# Patient Record
Sex: MALE | Race: WHITE | NOT HISPANIC OR LATINO | Employment: FULL TIME | URBAN - METROPOLITAN AREA
[De-identification: names, ages, dates, MRNs, and addresses within clinical notes are randomized per-mention and may not be internally consistent; named-entity substitution may affect disease eponyms.]

---

## 2019-10-11 ENCOUNTER — OFFICE VISIT (OUTPATIENT)
Dept: INTERNAL MEDICINE CLINIC | Facility: CLINIC | Age: 49
End: 2019-10-11
Payer: COMMERCIAL

## 2019-10-11 VITALS
DIASTOLIC BLOOD PRESSURE: 88 MMHG | BODY MASS INDEX: 28.47 KG/M2 | WEIGHT: 181.4 LBS | SYSTOLIC BLOOD PRESSURE: 150 MMHG | TEMPERATURE: 97.8 F | OXYGEN SATURATION: 98 % | HEIGHT: 67 IN | HEART RATE: 65 BPM

## 2019-10-11 DIAGNOSIS — Z23 NEED FOR INFLUENZA VACCINATION: Primary | ICD-10-CM

## 2019-10-11 DIAGNOSIS — Z00.00 ENCOUNTER FOR WELL ADULT EXAM WITHOUT ABNORMAL FINDINGS: ICD-10-CM

## 2019-10-11 PROBLEM — R03.0 ELEVATED BLOOD-PRESSURE READING WITHOUT DIAGNOSIS OF HYPERTENSION: Status: ACTIVE | Noted: 2019-10-11

## 2019-10-11 PROCEDURE — 99386 PREV VISIT NEW AGE 40-64: CPT | Performed by: INTERNAL MEDICINE
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
## 2019-10-11 NOTE — ASSESSMENT & PLAN NOTE
No acute or chronic problems identified  Blood pressure readings were in the high range of normal   Recommended low-salt foods and no added salt with diet  Continue his usual exercise regimen    We will continue to monitor this in follow-up

## 2019-10-13 LAB
ALBUMIN SERPL-MCNC: 4.5 G/DL (ref 3.6–5.1)
ALBUMIN/GLOB SERPL: 1.7 (CALC) (ref 1–2.5)
ALP SERPL-CCNC: 68 U/L (ref 40–115)
ALT SERPL-CCNC: 37 U/L (ref 9–46)
AST SERPL-CCNC: 27 U/L (ref 10–40)
BASOPHILS # BLD AUTO: 60 CELLS/UL (ref 0–200)
BASOPHILS NFR BLD AUTO: 1.4 %
BILIRUB SERPL-MCNC: 0.9 MG/DL (ref 0.2–1.2)
BUN SERPL-MCNC: 17 MG/DL (ref 7–25)
BUN/CREAT SERPL: NORMAL (CALC) (ref 6–22)
CALCIUM SERPL-MCNC: 9.7 MG/DL (ref 8.6–10.3)
CHLORIDE SERPL-SCNC: 105 MMOL/L (ref 98–110)
CHOLEST SERPL-MCNC: 175 MG/DL
CHOLEST/HDLC SERPL: 3.8 (CALC)
CO2 SERPL-SCNC: 31 MMOL/L (ref 20–32)
CREAT SERPL-MCNC: 1 MG/DL (ref 0.6–1.35)
EOSINOPHIL # BLD AUTO: 232 CELLS/UL (ref 15–500)
EOSINOPHIL NFR BLD AUTO: 5.4 %
ERYTHROCYTE [DISTWIDTH] IN BLOOD BY AUTOMATED COUNT: 12.3 % (ref 11–15)
GLOBULIN SER CALC-MCNC: 2.7 G/DL (CALC) (ref 1.9–3.7)
GLUCOSE SERPL-MCNC: 94 MG/DL (ref 65–99)
HCT VFR BLD AUTO: 47.9 % (ref 38.5–50)
HDLC SERPL-MCNC: 46 MG/DL
HGB BLD-MCNC: 16.3 G/DL (ref 13.2–17.1)
LDLC SERPL CALC-MCNC: 109 MG/DL (CALC)
LYMPHOCYTES # BLD AUTO: 1716 CELLS/UL (ref 850–3900)
LYMPHOCYTES NFR BLD AUTO: 39.9 %
MCH RBC QN AUTO: 32.1 PG (ref 27–33)
MCHC RBC AUTO-ENTMCNC: 34 G/DL (ref 32–36)
MCV RBC AUTO: 94.5 FL (ref 80–100)
MONOCYTES # BLD AUTO: 426 CELLS/UL (ref 200–950)
MONOCYTES NFR BLD AUTO: 9.9 %
NEUTROPHILS # BLD AUTO: 1866 CELLS/UL (ref 1500–7800)
NEUTROPHILS NFR BLD AUTO: 43.4 %
NONHDLC SERPL-MCNC: 129 MG/DL (CALC)
PLATELET # BLD AUTO: 202 THOUSAND/UL (ref 140–400)
PMV BLD REES-ECKER: 12.7 FL (ref 7.5–12.5)
POTASSIUM SERPL-SCNC: 4.3 MMOL/L (ref 3.5–5.3)
PROT SERPL-MCNC: 7.2 G/DL (ref 6.1–8.1)
RBC # BLD AUTO: 5.07 MILLION/UL (ref 4.2–5.8)
SL AMB EGFR AFRICAN AMERICAN: 102 ML/MIN/1.73M2
SL AMB EGFR NON AFRICAN AMERICAN: 88 ML/MIN/1.73M2
SODIUM SERPL-SCNC: 143 MMOL/L (ref 135–146)
TRIGL SERPL-MCNC: 107 MG/DL
WBC # BLD AUTO: 4.3 THOUSAND/UL (ref 3.8–10.8)

## 2019-12-04 ENCOUNTER — OFFICE VISIT (OUTPATIENT)
Dept: INTERNAL MEDICINE CLINIC | Facility: CLINIC | Age: 49
End: 2019-12-04
Payer: COMMERCIAL

## 2019-12-04 VITALS
OXYGEN SATURATION: 99 % | SYSTOLIC BLOOD PRESSURE: 152 MMHG | HEIGHT: 67 IN | BODY MASS INDEX: 27.31 KG/M2 | TEMPERATURE: 98.8 F | HEART RATE: 88 BPM | WEIGHT: 174 LBS | DIASTOLIC BLOOD PRESSURE: 90 MMHG

## 2019-12-04 DIAGNOSIS — M75.42 ROTATOR CUFF IMPINGEMENT SYNDROME OF LEFT SHOULDER: ICD-10-CM

## 2019-12-04 DIAGNOSIS — S43.432A LABRAL TEAR OF SHOULDER, LEFT, INITIAL ENCOUNTER: ICD-10-CM

## 2019-12-04 DIAGNOSIS — Z23 NEED FOR INFLUENZA VACCINATION: Primary | ICD-10-CM

## 2019-12-04 DIAGNOSIS — I10 HYPERTENSION, ESSENTIAL: ICD-10-CM

## 2019-12-04 PROCEDURE — 99213 OFFICE O/P EST LOW 20 MIN: CPT | Performed by: INTERNAL MEDICINE

## 2019-12-04 PROCEDURE — 3008F BODY MASS INDEX DOCD: CPT | Performed by: INTERNAL MEDICINE

## 2019-12-04 RX ORDER — AMLODIPINE BESYLATE 5 MG/1
5 TABLET ORAL DAILY
Qty: 90 TABLET | Refills: 3 | Status: SHIPPED | OUTPATIENT
Start: 2019-12-04 | End: 2021-04-02 | Stop reason: ALTCHOICE
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
## 2019-12-04 NOTE — ASSESSMENT & PLAN NOTE
Will schedule patient for MRI of shoulder and possibly orthopedic follow-up depending upon the results

## 2019-12-04 NOTE — ASSESSMENT & PLAN NOTE
Will initiate amlodipine 5 mg daily since his blood pressure has been sustained over his previous visits  Will re-evaluate once his shoulder problem is corrected    As his current pain symptomatology may be affecting his blood pressure

## 2019-12-04 NOTE — ASSESSMENT & PLAN NOTE
Will initiate amlodipine 5 mg daily for mildly elevated blood pressure which has been consistent since his last visit    Patient does admit to being noncompliant with low salt foods

## 2019-12-09 ENCOUNTER — TELEPHONE (OUTPATIENT)
Dept: INTERNAL MEDICINE CLINIC | Age: 49
End: 2019-12-09

## 2019-12-09 DIAGNOSIS — S43.432A LABRAL TEAR OF SHOULDER, LEFT, INITIAL ENCOUNTER: Primary | ICD-10-CM

## 2019-12-09 NOTE — TELEPHONE ENCOUNTER
Discussed with Dr Prashant Cannon  Can put in referral for PT    Left message on machine for patient to call me at [facility] office

## 2019-12-09 NOTE — TELEPHONE ENCOUNTER
Dr Sol Sandoval is denying the MRI of the Left Shoulder  They want him to have physical therapy, chiropractic treatments directed home exercise for four weeks in the last six months of his visit  IF you want to do a peer to peer please call them at [phone]    Tracking# [id number]    Patient has [employer]  Please due this today or tomorrow    Patient has an appt scheduled for the MRI this Friday 12/13/2019

## 2019-12-10 NOTE — TELEPHONE ENCOUNTER
Patient would like to cancel the MRI on Friday 12/13/19 and try the physical therapy for now    Please cancel appointment for patient

## 2019-12-11 ENCOUNTER — EVALUATION (OUTPATIENT)
Dept: PHYSICAL THERAPY | Age: 49
End: 2019-12-11
Payer: COMMERCIAL

## 2019-12-11 DIAGNOSIS — S43.432A LABRAL TEAR OF SHOULDER, LEFT, INITIAL ENCOUNTER: Primary | ICD-10-CM

## 2019-12-11 PROCEDURE — 97161 PT EVAL LOW COMPLEX 20 MIN: CPT | Performed by: PHYSICAL THERAPIST

## 2019-12-11 PROCEDURE — 97110 THERAPEUTIC EXERCISES: CPT | Performed by: PHYSICAL THERAPIST

## 2019-12-11 NOTE — PROGRESS NOTES
PT Evaluation     Today's date: 2019  Patient name: Rachel Suarez  : 1970  MRN: [id number]  Referring provider: Lionel Brownlee MD  Dx:   Encounter Diagnosis     ICD-10-CM    1  Labral tear of shoulder, left, initial encounter J39 431G Ambulatory referral to Physical Therapy                  Assessment  Assessment details: Pt presents to PT with cc of L shoulder, neck, UT and paraspinal pain  Pt has radiculopathy in L UE  That originates in cervical spine as seen with spurlings test and movement testing  Pt has been guarding shoulder due to pain which is likely contributing to decreased ROM at this time  Pt will benefit from skilled PT in order to improve LUE strength and ROM and decrease radicular symptoms     Impairments: abnormal coordination, abnormal muscle firing, abnormal muscle tone, abnormal or restricted ROM, abnormal movement, impaired balance, impaired physical strength, lacks appropriate home exercise program, pain with function, scapular dyskinesis, poor posture  and poor body mechanics    Goals  In 4 weeks pt will:  -Be independent with phase I of HEP  -report pain 5/10 at worst    By discharge pt will:  -Be independent with Phase II of HEP  -demonstrate full L shoulder ROM  -perform full cervical ROM w/o radiculopathy     Plan  Patient would benefit from: skilled physical therapy  Planned modality interventions: TENS, cryotherapy and thermotherapy: hydrocollator packs  Planned therapy interventions: joint mobilization, activity modification, neuromuscular re-education, patient education, postural training, functional ROM exercises, home exercise program, therapeutic exercise, therapeutic activities, manual therapy and coordination  Frequency: 2x week  Duration in weeks: 6  Plan of Care beginning date: 2019  Plan of Care expiration date: 2020  Treatment plan discussed with: patient        Subjective Evaluation    History of Present Illness  Date of onset: 9/15/2019  Mechanism of injury: Pt reports to PT with pain in L shoulder, UT and neck  Pt guards L shoulder and avoids elevation of extremity  Pt has N/T into LUE with elevation and cervical extension  Pain  Current pain ratin  At best pain ratin  At worst pain rating: 10    Patient Goals  Patient goals for therapy: decreased pain, increased motion, increased strength, independence with ADLs/IADLs and return to sport/leisure activities          Objective     Neurological Testing     Sensation   Cervical/Thoracic   Left   Diminished: light touch    Reflexes   Left   Biceps (C5/C6): normal (2+)  Brachioradialis (C6): normal (2+)    Active Range of Motion   Cervical/Thoracic Spine       Cervical    Flexion:  WFL  Extension:  WFL  Left lateral flexion:  WFL  Right lateral flexion:  WFL  Left rotation:  [facility]  Right rotation:  [facility]    Strength/Myotome Testing     Left Shoulder     Planes of Motion   External rotation at 0°: 3+   Internal rotation at 0°: 3+     Left Elbow   Flexion: 3+  Extension: 3+    Left Wrist/Hand   Wrist flexion: 3+    Tests   Cervical     Left   Positive Spurling's Test A  Left Shoulder   Positive ULTT1, ULTT3 and ULTT4  Precautions: N/A      Manual                                                                                   Exercise Diary              Repeated extension- C spine comp  Median nerve glides Comp                                                                                                                                                                                                                                                            Modalities

## 2019-12-13 ENCOUNTER — OFFICE VISIT (OUTPATIENT)
Dept: PHYSICAL THERAPY | Age: 49
End: 2019-12-13
Payer: COMMERCIAL

## 2019-12-13 DIAGNOSIS — S43.432A LABRAL TEAR OF SHOULDER, LEFT, INITIAL ENCOUNTER: Primary | ICD-10-CM

## 2019-12-13 PROCEDURE — 97110 THERAPEUTIC EXERCISES: CPT | Performed by: PHYSICAL THERAPIST

## 2019-12-13 PROCEDURE — 97140 MANUAL THERAPY 1/> REGIONS: CPT | Performed by: PHYSICAL THERAPIST

## 2019-12-13 NOTE — PROGRESS NOTES
Daily Note     Today's date: 2019  Patient name: Rajani Germain  : 1970  MRN: [id number]  Referring provider: Rosario Torrez MD  Dx:   Encounter Diagnosis     ICD-10-CM    1  Labral tear of shoulder, left, initial encounter H64 963X                   Subjective: Pt reports less symptoms after repeated cervical extension      Objective: See treatment diary below      Assessment: Tolerated treatment well  Patient demonstrated fatigue post treatment, would benefit from continued PT and pt has increased paraspinal/periscapular tone  Pt has increased L shoulder motion since eval and reports less N/T overall, but continues to have intermittent N/T into L hand  Plan: Continue per plan of care  Progress treatment as tolerated  Precautions: N/A      Manual              STM/IASTM L scap  JEONG           CTJ grade IV  JEONG           T spine mobs  II/III                                         Exercise Diary             Repeated extension- C spine comp   x20           Median nerve glides Comp  np           Prolonged C-spine ext  2'           Pec minor stretch  5x30s                                                                                                                                                                                                                               Modalities

## 2019-12-17 ENCOUNTER — OFFICE VISIT (OUTPATIENT)
Dept: PHYSICAL THERAPY | Age: 49
End: 2019-12-17
Payer: COMMERCIAL

## 2019-12-17 DIAGNOSIS — S43.432A LABRAL TEAR OF SHOULDER, LEFT, INITIAL ENCOUNTER: Primary | ICD-10-CM

## 2019-12-17 PROCEDURE — 97140 MANUAL THERAPY 1/> REGIONS: CPT | Performed by: PHYSICAL THERAPIST

## 2019-12-17 PROCEDURE — 97110 THERAPEUTIC EXERCISES: CPT | Performed by: PHYSICAL THERAPIST

## 2019-12-17 NOTE — PROGRESS NOTES
Daily Note     Today's date: 2019  Patient name: Evaristo Haque  : 1970  MRN: [id number]  Referring provider: Fer Chavez MD  Dx:   Encounter Diagnosis     ICD-10-CM    1  Labral tear of shoulder, left, initial encounter P44 366D                   Subjective: Pt reports less symptoms overall, but continues to have N/T into LUE with shoulder elevation      Objective: See treatment diary below      Assessment: Tolerated treatment well  Patient demonstrated fatigue post treatment, would benefit from continued PT and continues to have N/T into LUE with stretching of pec minor and UE elevation  Plan: Continue per plan of care  Progress treatment as tolerated  Precautions: N/A      Manual             STM/IASTM L scap  JEONG JEONG          CTJ grade V  JEONG np          T spine mobs  II/III III/IV          GH mobs   JEONG          ER/IR   JEONG          Rhy stabilization    JEONG              Exercise Diary            Repeated extension- C spine comp   x20 np          Median nerve glides Comp  np np          Prolonged C-spine ext  2' np          Pec minor stretch  5x30s 5x30s          LS/UT stretch   5x30s                                                                                                                                                                                                                 Modalities

## 2019-12-20 ENCOUNTER — OFFICE VISIT (OUTPATIENT)
Dept: PHYSICAL THERAPY | Age: 49
End: 2019-12-20
Payer: COMMERCIAL

## 2019-12-20 DIAGNOSIS — S43.432A LABRAL TEAR OF SHOULDER, LEFT, INITIAL ENCOUNTER: Primary | ICD-10-CM

## 2019-12-20 PROCEDURE — 97140 MANUAL THERAPY 1/> REGIONS: CPT | Performed by: PHYSICAL THERAPIST

## 2019-12-20 PROCEDURE — 97110 THERAPEUTIC EXERCISES: CPT | Performed by: PHYSICAL THERAPIST

## 2019-12-20 PROCEDURE — 97112 NEUROMUSCULAR REEDUCATION: CPT | Performed by: PHYSICAL THERAPIST

## 2019-12-20 NOTE — PROGRESS NOTES
Daily Note     Today's date: 2019  Patient name: Christina Jj  : 1970  MRN: [id number]  Referring provider: Diesy Clemente MD  Dx:   Encounter Diagnosis     ICD-10-CM    1  Labral tear of shoulder, left, initial encounter Z90 714R                   Subjective: Pt reports having less pain/difficulty with shoulder movement  States forearm numbness has decreased, 1st finger remains numb      Objective: See treatment diary below      Assessment: Tolerated treatment well  Patient demonstrated fatigue post treatment, would benefit from continued PT and has less radicular symptoms but continues to have pain in periscapular region  Plan: Continue per plan of care  Progress treatment as tolerated  Precautions: N/A      Manual            STM/IASTM L scap  JEONG JEONG JEONG         CTJ grade V  JEONG np np         T spine mobs  II/III III/IV JEONG         GH mobs   JEONG np         ER/IR   JEONG np         Rhy stabilization    JEONG np         Scap PNF    JEONG         All 3 nerve glides    JEONG             Exercise Diary           Repeated extension- C spine comp   x20 np x10         Median nerve glides Comp  np np np         Prolonged C-spine ext  2' np np         Pec minor stretch  5x30s 5x30s          LS/UT stretch   5x30s          Lat stretch    5x30s                                                                                                                                                                                                   Modalities

## 2019-12-23 ENCOUNTER — OFFICE VISIT (OUTPATIENT)
Dept: PHYSICAL THERAPY | Age: 49
End: 2019-12-23
Payer: COMMERCIAL

## 2019-12-23 DIAGNOSIS — S43.432A LABRAL TEAR OF SHOULDER, LEFT, INITIAL ENCOUNTER: Primary | ICD-10-CM

## 2019-12-23 PROCEDURE — 97140 MANUAL THERAPY 1/> REGIONS: CPT | Performed by: PHYSICAL THERAPIST

## 2019-12-23 PROCEDURE — 97112 NEUROMUSCULAR REEDUCATION: CPT | Performed by: PHYSICAL THERAPIST

## 2019-12-23 PROCEDURE — 97110 THERAPEUTIC EXERCISES: CPT | Performed by: PHYSICAL THERAPIST

## 2019-12-23 NOTE — PROGRESS NOTES
Daily Note     Today's date: 2019  Patient name: Annalee Matos  : 1970  MRN: [id number]  Referring provider: Mainor Ham MD  Dx:   Encounter Diagnosis     ICD-10-CM    1  Labral tear of shoulder, left, initial encounter U35 572R                   Subjective: pt reports less N/T into LUE, decreased pain in L UT      Objective: See treatment diary below      Assessment: Tolerated treatment well  Patient demonstrated fatigue post treatment, would benefit from continued PT and has decreased N/T into LUE, but weakness remains as in tricep and bracioradialis  Plan: Continue per plan of care  Progress treatment as tolerated  Precautions: N/A      Manual           STM/IASTM L scap  JEONG JEONG JEONG JEONG        CTJ grade V  JEONG np np np        T spine mobs  II/III III/IV JEONG np        GH mobs   JEONG np np        ER/IR   JEONG np np        Rhy stabilization    JEONG np np        Scap PNF    JEONG np        All 3 nerve glides    JEONG JEONG            Exercise Diary          Repeated extension- C spine comp   x20 np x10 np        Median nerve glides Comp  np np np np        Prolonged C-spine ext  2' np np np        Pec minor stretch  5x30s 5x30s 5x30s 5x30s        LS/UT stretch   5x30s np np        Lat stretch    5x30s 5x30s        Arm Bike     5'        tricep ext     2x x10, x5, xtf        Brachioradialis curl     2x10 3rd TF                                                                                                                                                           Modalities

## 2019-12-24 ENCOUNTER — OFFICE VISIT (OUTPATIENT)
Dept: PHYSICAL THERAPY | Age: 49
End: 2019-12-24
Payer: COMMERCIAL

## 2019-12-24 DIAGNOSIS — S43.432A LABRAL TEAR OF SHOULDER, LEFT, INITIAL ENCOUNTER: Primary | ICD-10-CM

## 2019-12-24 PROCEDURE — 97112 NEUROMUSCULAR REEDUCATION: CPT | Performed by: PHYSICAL THERAPIST

## 2019-12-24 PROCEDURE — 97140 MANUAL THERAPY 1/> REGIONS: CPT | Performed by: PHYSICAL THERAPIST

## 2019-12-24 PROCEDURE — 97110 THERAPEUTIC EXERCISES: CPT | Performed by: PHYSICAL THERAPIST

## 2019-12-24 NOTE — PROGRESS NOTES
Daily Note     Today's date: 2019  Patient name: Erin Maldonado  : 1970  MRN: [id number]  Referring provider: Case Stovall MD  Dx:   Encounter Diagnosis     ICD-10-CM    1  Labral tear of shoulder, left, initial encounter V35 705L                   Subjective: Pt reports minimal soreness/pain       Objective: See treatment diary below      Assessment: Tolerated treatment well  Patient demonstrated fatigue post treatment, would benefit from continued PT and continues to have C6-C7 myotomal weakness but has shown an increase in L shoulder ROM      Plan: Continue per plan of care  Progress treatment as tolerated  Precautions: N/A      Manual          STM/IASTM L scap  JEONG JEONG JEONG JEONG np       CTJ grade V  JEONG np np np np       T spine mobs  II/III III/IV JEONG np np       GH mobs   JEONG np np JEONG       ER/IR   JEONG np np np       Rhy stabilization    JEONG np np np       Scap PNF    JEONG np np       All 3 nerve glides    JEONG JEONG np           Exercise Diary         Repeated extension- C spine comp   x20 np x10 np np       Median nerve glides Comp  np np np np np       Prolonged C-spine ext  2' np np np np       Pec minor stretch  5x30s 5x30s 5x30s 5x30s 5x30s       LS/UT stretch   5x30s np np np       Lat stretch    5x30s 5x30s np       Arm Bike     5' 10'       tricep ext     2x x10, x5, xtf 2x x10, 3rd TF 20#       Brachioradialis curl     2x10 3rd TF  8# 2x10 3rd TF  8#       therabar wrist flex/ext      2'                                                                                                                                             Modalities

## 2019-12-30 ENCOUNTER — OFFICE VISIT (OUTPATIENT)
Dept: PHYSICAL THERAPY | Age: 49
End: 2019-12-30
Payer: COMMERCIAL

## 2019-12-30 DIAGNOSIS — S43.432A LABRAL TEAR OF SHOULDER, LEFT, INITIAL ENCOUNTER: Primary | ICD-10-CM

## 2019-12-30 PROCEDURE — 97140 MANUAL THERAPY 1/> REGIONS: CPT | Performed by: PHYSICAL THERAPIST

## 2019-12-30 PROCEDURE — 97112 NEUROMUSCULAR REEDUCATION: CPT | Performed by: PHYSICAL THERAPIST

## 2019-12-30 NOTE — PROGRESS NOTES
Daily Note     Today's date: 2019  Patient name: Evaristo Haque  : 1970  MRN: [id number]  Referring provider: Fer Chavez MD  Dx:   Encounter Diagnosis     ICD-10-CM    1  Labral tear of shoulder, left, initial encounter M19 039R                   Subjective: Pt reports no symptoms upon waking up, but has pain, N/T in LUE once he sits up and move LUE      Objective: See treatment diary below      Assessment: Tolerated treatment well  Patient demonstrated fatigue post treatment, would benefit from continued PT and has increased strength in C6-C7 myotome as compared to start of PT  Pt has less N/T into LUE but it is brought on by L shoulder elevation  Plan: Continue per plan of care  Progress treatment as tolerated  Precautions: N/A      Manual         STM/IASTM L scap  JEONG JEOGN JEONG JEONG np np      CTJ grade V  JEONG np np np np np      T spine mobs  II/III III/IV JEONG np np np      GH mobs   JEONG np np JEONG JEONG      ER/IR   JEONG np np np np      Rhy stabilization    JEONG np np np np      Scap PNF    JEONG np np np      All 3 nerve glides    JEONG JEONG np np          Exercise Diary        Repeated extension- C spine comp   x20 np x10 np np np      Median nerve glides Comp  np np np np np np      Prolonged C-spine ext  2' np np np np np      Pec minor stretch  5x30s 5x30s 5x30s 5x30s 5x30s np      LS/UT stretch   5x30s np np np np      Lat stretch    5x30s 5x30s np np      Arm Bike     5' 10' 10'      tricep ext     2x x10, x5, xtf 2x x10, 3rd TF 20# 2x x10, 3rd TF 20#      Brachioradialis curl     2x10 3rd TF  8# 2x10 3rd TF  8# 2x10 3rd TF  8#      therabar wrist flex/ext      2' np      Rows       25# 3x15      Bicep eccentrics-CC       10# 3x15                                                                                                                  Modalities

## 2020-01-02 ENCOUNTER — OFFICE VISIT (OUTPATIENT)
Dept: PHYSICAL THERAPY | Age: 50
End: 2020-01-02
Payer: COMMERCIAL

## 2020-01-02 DIAGNOSIS — S43.432A LABRAL TEAR OF SHOULDER, LEFT, INITIAL ENCOUNTER: Primary | ICD-10-CM

## 2020-01-02 PROCEDURE — 97112 NEUROMUSCULAR REEDUCATION: CPT | Performed by: PHYSICAL THERAPIST

## 2020-01-02 PROCEDURE — 97140 MANUAL THERAPY 1/> REGIONS: CPT | Performed by: PHYSICAL THERAPIST

## 2020-01-02 PROCEDURE — 97110 THERAPEUTIC EXERCISES: CPT | Performed by: PHYSICAL THERAPIST

## 2020-01-02 NOTE — PROGRESS NOTES
Daily Note     Today's date: 2020  Patient name: Deb Enriquez  : 1970  MRN: [id number]  Referring provider: Joy Escobar MD  Dx:   Encounter Diagnosis     ICD-10-CM    1  Labral tear of shoulder, left, initial encounter U49 735L                   Subjective: Pt reports minimal changes      Objective: See treatment diary below      Assessment: Tolerated treatment well  Patient demonstrated fatigue post treatment, would benefit from continued PT and continues to have weakness in elbow flexors, wrist extenders      Plan: Continue per plan of care  Progress treatment as tolerated  Precautions: N/A      Manual        STM/IASTM L scap  JEONG JEONG JEONG JEONG np np np     CTJ grade V  JEONG np np np np np np     T spine mobs  II/III III/IV JEONG np np np np     GH mobs   JEONG np np JEONG JEONG JEONG     ER/IR   JEONG np np np np np     Rhy stabilization    JEONG np np np np np     Scap PNF    JEONG np np np np     All 3 nerve glides    JEONG JEONG np np JEONG         Exercise Diary       Repeated extension- C spine comp   x20 np x10 np np np np     Median nerve glides Comp  np np np np np np np     Prolonged C-spine ext  2' np np np np np np     Pec minor stretch  5x30s 5x30s 5x30s 5x30s 5x30s np np     LS/UT stretch   5x30s np np np np np     Lat stretch    5x30s 5x30s np np np     Arm Bike     5' 10' 10' 10'     tricep ext     2x x10, x5, xtf 2x x10, 3rd TF 20# 2x x10, 3rd TF 20# 2x x10, 3rd TF 30#     Brachioradialis curl     2x10 3rd TF  8# 2x10 3rd TF  8# 2x10 3rd TF  8# 2x10 3rd TF  15#     therabar wrist flex/ext      2' np 2'     Rows       25# 3x15 np     Bicep eccentrics-CC       10# 3x15 10# 3x15                                                                                                                 Modalities

## 2020-01-07 ENCOUNTER — OFFICE VISIT (OUTPATIENT)
Dept: PHYSICAL THERAPY | Age: 50
End: 2020-01-07
Payer: COMMERCIAL

## 2020-01-07 DIAGNOSIS — S43.432A LABRAL TEAR OF SHOULDER, LEFT, INITIAL ENCOUNTER: Primary | ICD-10-CM

## 2020-01-07 PROCEDURE — 97112 NEUROMUSCULAR REEDUCATION: CPT | Performed by: PHYSICAL THERAPIST

## 2020-01-07 PROCEDURE — 97110 THERAPEUTIC EXERCISES: CPT | Performed by: PHYSICAL THERAPIST

## 2020-01-07 PROCEDURE — 97140 MANUAL THERAPY 1/> REGIONS: CPT | Performed by: PHYSICAL THERAPIST

## 2020-01-07 NOTE — PROGRESS NOTES
Daily Note     Today's date: 2020  Patient name: Carlos Carr  : 1970  MRN: [id number]  Referring provider: Beata Kothari MD  Dx:   Encounter Diagnosis     ICD-10-CM    1  Labral tear of shoulder, left, initial encounter C40 552O                    Subjective: Pt reports feeling stronger overall, but states he is still unable to do a push up due to tricep weakness      Objective: See treatment diary below      Assessment: Tolerated treatment well  Patient demonstrated fatigue post treatment, would benefit from continued PT and continues to have pain in bicep tendon with activity  Pain in anterior shoulder is likely capsular tightness      Plan: Continue per plan of care  Progress treatment as tolerated  Precautions: N/A      Manual       STM/IASTM L scap  JEONG JEONG JEONG JEONG np np np np    CTJ grade V  JEONG np np np np np np np    T spine mobs  II/III III/IV JEONG np np np np np    GH mobs   JEONG np np JEONG JEONG JEONG JEONG    ER/IR   JEONG np np np np np np    Rhy stabilization    JEONG np np np np np JEONG    Scap PNF    JEONG np np np np np    All 3 nerve glides    JEONG JEONG np np JEONG np    C-ext off plinth         JEONG        Exercise Diary      Repeated extension- C spine comp   x20 np x10 np np np np np    Median nerve glides Comp  np np np np np np np np    Prolonged C-spine ext  2' np np np np np np np    Pec minor stretch  5x30s 5x30s 5x30s 5x30s 5x30s np np np    LS/UT stretch   5x30s np np np np np np    Lat stretch    5x30s 5x30s np np np np    Arm Bike     5' 10' 10' 10' 10'    tricep ext     2x x10, x5, xtf 2x x10, 3rd TF 20# 2x x10, 3rd TF 20# 2x x10, 3rd TF 30# 2x x10, 3rd TF 30#    Brachioradialis curl     2x10 3rd TF  8# 2x10 3rd TF  8# 2x10 3rd TF  8# 2x10 3rd TF  15# np    therabar wrist flex/ext      2' np 2' 5'    Rows       25# 3x15 np np    Bicep eccentrics-CC       10# 3x15 10# 3x15 15# 3x15    Finger webbing 5'                                                                                                   Modalities

## 2020-01-09 ENCOUNTER — OFFICE VISIT (OUTPATIENT)
Dept: PHYSICAL THERAPY | Age: 50
End: 2020-01-09
Payer: COMMERCIAL

## 2020-01-09 DIAGNOSIS — S43.432A LABRAL TEAR OF SHOULDER, LEFT, INITIAL ENCOUNTER: Primary | ICD-10-CM

## 2020-01-09 PROCEDURE — 97140 MANUAL THERAPY 1/> REGIONS: CPT | Performed by: PHYSICAL THERAPIST

## 2020-01-09 PROCEDURE — 97112 NEUROMUSCULAR REEDUCATION: CPT | Performed by: PHYSICAL THERAPIST

## 2020-01-09 PROCEDURE — 97110 THERAPEUTIC EXERCISES: CPT | Performed by: PHYSICAL THERAPIST

## 2020-01-09 NOTE — PROGRESS NOTES
Daily Note     Today's date: 2020  Patient name: Brett Collins  : 1970  MRN: [id number]  Referring provider: Daniel Villanueva MD  Dx:   Encounter Diagnosis     ICD-10-CM    1  Labral tear of shoulder, left, initial encounter S43 432A            Pt started treatment with PTA       Subjective: Pt reports minimal N/T into LUE       Objective: See treatment diary below      Assessment: Tolerated treatment well  Patient demonstrated fatigue post treatment, would benefit from continued PT and has anterior shoulder pain with ER/ cross body abduction  Pt has relief with anterior shoulder mobilizations indicating decreased capsular movement  Plan: Continue per plan of care  Progress treatment as tolerated  Precautions: N/A      Manual      STM/IASTM L scap  JEONG JEONG JEONG JEONG np np np np np   CTJ grade V  JEONG np np np np np np np np   T spine mobs  II/III III/IV JEONG np np np np np np   GH mobs   JEONG np np JEONG JEONG JEONG JEONG Inf/ant JEONG   ER/IR   JEONG np np np np np np np   Rhy stabilization    JEONG np np np np np JEONG np   Scap PNF    JEONG np np np np np np   All 3 nerve glides    JEONG JEONG np np JEONG np np   C-ext off plinth         JEONG np       Exercise Diary     Repeated extension- C spine comp   x20 np x10 np np np np np np   Median nerve glides Comp  np np np np np np np np np   Prolonged C-spine ext  2' np np np np np np np np   Pec minor stretch  5x30s 5x30s 5x30s 5x30s 5x30s np np np 5x30s minor/ajor   LS/UT stretch   5x30s np np np np np np np   Lat stretch    5x30s 5x30s np np np np np   Arm Bike     5' 10' 10' 10' 10' 10'   tricep ext     2x x10, x5, xtf 2x x10, 3rd TF 20# 2x x10, 3rd TF 20# 2x x10, 3rd TF 30# 2x x10, 3rd TF 30# 2x x10, 3rd TF 30#   Brachioradialis curl     2x10 3rd TF  8# 2x10 3rd TF  8# 2x10 3rd TF  8# 2x10 3rd TF  15# np 2x10 3rd TF 10#   therabar wrist flex/ext      2' np 2' 5' np   Rows 25# 3x15 np np np   Bicep eccentrics-CC       10# 3x15 10# 3x15 15# 3x15 15# 3x15   Finger webbing         5' np                                                                                                  Modalities

## 2020-01-14 ENCOUNTER — OFFICE VISIT (OUTPATIENT)
Dept: PHYSICAL THERAPY | Age: 50
End: 2020-01-14
Payer: COMMERCIAL

## 2020-01-14 DIAGNOSIS — S43.432A LABRAL TEAR OF SHOULDER, LEFT, INITIAL ENCOUNTER: Primary | ICD-10-CM

## 2020-01-14 PROCEDURE — 97112 NEUROMUSCULAR REEDUCATION: CPT | Performed by: PHYSICAL THERAPIST

## 2020-01-14 PROCEDURE — 97140 MANUAL THERAPY 1/> REGIONS: CPT | Performed by: PHYSICAL THERAPIST

## 2020-01-14 PROCEDURE — 97110 THERAPEUTIC EXERCISES: CPT | Performed by: PHYSICAL THERAPIST

## 2020-01-14 NOTE — PROGRESS NOTES
Daily Note     Today's date: 2020  Patient name: Kae Alexander  : 1970  MRN: [id number]  Referring provider: Harvey Martínez MD  Dx:   Encounter Diagnosis     ICD-10-CM    1  Labral tear of shoulder, left, initial encounter X23 811M                   Subjective: Pt reports ability to do push up for first time since pain began       Objective: See treatment diary below      Assessment: Tolerated treatment well  Patient demonstrated fatigue post treatment, would benefit from continued PT and demonstrates increased strength in triceps and pec major/minor  Plan: Continue per plan of care  Progress treatment as tolerated         Precautions: N/A      Manual     STM/IASTM L scap np         np   CTJ grade V np         np   T spine mobs np         np   GH mobs Ant-JEONG         Inf/ant JEONG   ER/IR np         np   Rhy stabilization  np         np   Scap PNF np         np   All 3 nerve glides np         np   Manual resistance triceps JEONG            C-ext off plinth np         np       Exercise Diary     Repeated extension- C spine np         np   Median nerve glides np         np   Prolonged C-spine ext np         np   Pec minor stretch np         5x30s minor/ajor   LS/UT stretch np         np   Lat stretch np         np   Arm Bike 10'         10'   tricep ext 40# 3x10         2x x10, 3rd TF 30#   Brachioradialis curl np         2x10 3rd TF 10#   therabar wrist flex/ext GTB 5'         np   Rows np         np   Bicep eccentrics-CC np         15# 3x15   Finger webbing 2'         np   Lat pd Cc 40# 3x15            theracane 5'                                                                                 Modalities

## 2020-01-16 ENCOUNTER — EVALUATION (OUTPATIENT)
Dept: PHYSICAL THERAPY | Age: 50
End: 2020-01-16
Payer: COMMERCIAL

## 2020-01-16 DIAGNOSIS — S43.432A LABRAL TEAR OF SHOULDER, LEFT, INITIAL ENCOUNTER: Primary | ICD-10-CM

## 2020-01-16 PROCEDURE — 97750 PHYSICAL PERFORMANCE TEST: CPT | Performed by: PHYSICAL THERAPIST

## 2020-01-16 PROCEDURE — 97112 NEUROMUSCULAR REEDUCATION: CPT | Performed by: PHYSICAL THERAPIST

## 2020-01-16 PROCEDURE — 97110 THERAPEUTIC EXERCISES: CPT | Performed by: PHYSICAL THERAPIST

## 2020-01-16 PROCEDURE — 97140 MANUAL THERAPY 1/> REGIONS: CPT | Performed by: PHYSICAL THERAPIST

## 2020-01-16 NOTE — PROGRESS NOTES
PT Evaluation     Today's date: 2020  Patient name: Delroy Sewell  : 1970  MRN: [id number]  Referring provider: Phillip Fonseca MD  Dx:   Encounter Diagnosis     ICD-10-CM    1  Labral tear of shoulder, left, initial encounter T71 727W                   Assessment  Assessment details: Since presenting to PT, pt has made improvements in L UE strength and pain-free cervical ROM  Pt has decreased N/T into LUE but continues to have weakness, particularly in L triceps and has intermittent L shoulder pain with ADLs  Pt will benefit from skilled PT in order to improve UE strength and decrease pain/difficulty with ADLs     Impairments: abnormal coordination, abnormal muscle firing, abnormal muscle tone, abnormal or restricted ROM, abnormal movement, impaired balance, impaired physical strength, pain with function, scapular dyskinesis, poor posture  and poor body mechanics    Goals  In 4 weeks pt will:  -Be independent with phase I of HEP-met  -report pain 5/10 at worst-met    By discharge pt will:  -Be independent with Phase II of HEP-progressing, continue  -demonstrate full L shoulder ROM-met  -perform full cervical ROM w/o radiculopathy -progressing, continue    Plan  Patient would benefit from: skilled physical therapy  Planned modality interventions: TENS, cryotherapy and thermotherapy: hydrocollator packs  Planned therapy interventions: joint mobilization, activity modification, neuromuscular re-education, patient education, postural training, functional ROM exercises, home exercise program, therapeutic exercise, therapeutic activities, manual therapy and coordination  Frequency: 2x week  Duration in weeks: 6  Plan of Care beginning date: 2020  Plan of Care expiration date: 2020  Treatment plan discussed with: patient        Subjective Evaluation    History of Present Illness  Date of onset: 9/15/2019  Mechanism of injury: Pt reports less pain overall and has improved strength but continues to have weakness in L triceps  Pain  Current pain ratin  At best pain ratin  At worst pain rating: 3    Patient Goals  Patient goals for therapy: decreased pain, increased motion, increased strength, independence with ADLs/IADLs and return to sport/leisure activities          Objective     Active Range of Motion   Cervical/Thoracic Spine       Cervical    Flexion:  WFL  Extension:  WFL  Left lateral flexion:  WFL  Right lateral flexion:  WFL  Left rotation:  WFL  Right rotation:  [facility]    Strength/Myotome Testing     Left Shoulder     Planes of Motion   External rotation at 0°: 4   Internal rotation at 0°: 4     Left Elbow   Flexion: 4  Extension: 4    Left Wrist/Hand   Wrist flexion: 4             Precautions: N/A      Manual              Inf GH mobs JEONG            Pec minor stretch                                                        Exercise Diary              Arm bike 10'            Lat pull down 50# 3x15            Tricep ext 35# 3x15            L chest press 8# 4x15                                                                                                                                                                                                                                Modalities

## 2020-01-21 ENCOUNTER — OFFICE VISIT (OUTPATIENT)
Dept: PHYSICAL THERAPY | Age: 50
End: 2020-01-21
Payer: COMMERCIAL

## 2020-01-21 DIAGNOSIS — S43.432A LABRAL TEAR OF SHOULDER, LEFT, INITIAL ENCOUNTER: Primary | ICD-10-CM

## 2020-01-21 PROCEDURE — 97110 THERAPEUTIC EXERCISES: CPT | Performed by: PHYSICAL THERAPIST

## 2020-01-21 PROCEDURE — 97112 NEUROMUSCULAR REEDUCATION: CPT | Performed by: PHYSICAL THERAPIST

## 2020-01-21 NOTE — PROGRESS NOTES
Daily Note     Today's date: 2020  Patient name: Lorna Medellin  : 1970  MRN: [id number]  Referring provider: Kurt Harrison MD  Dx:   Encounter Diagnosis     ICD-10-CM    1  Labral tear of shoulder, left, initial encounter Z10 306V                   Subjective: Pt states he continues to have burning in L shoulder with push ups, but states he feels stronger overall      Objective: See treatment diary below      Assessment: Tolerated treatment well  Patient demonstrated fatigue post treatment, would benefit from continued PT and pt tolerated increased resistance well  Pt was challenged to hold push up position on LUE with perturbations  Plan: Continue per plan of care  Progress treatment as tolerated         Precautions: N/A      Manual             Inf GH mobs JEONG np           Pec minor stretch  np                                                      Exercise Diary             Arm bike 10' 10'           Lat pull down 50# 3x15 70# 4x10           Tricep ext 35# 3x15 40# 3x15           L chest press 8# 4x15 8# 4x15           IR/ER  3x10 5#           1 arm push up position BOSU  2x30s           Theracane  5'                                                                                                                                                                                        Modalities

## 2020-01-23 ENCOUNTER — APPOINTMENT (OUTPATIENT)
Dept: PHYSICAL THERAPY | Age: 50
End: 2020-01-23
Payer: COMMERCIAL

## 2020-01-28 ENCOUNTER — APPOINTMENT (OUTPATIENT)
Dept: PHYSICAL THERAPY | Age: 50
End: 2020-01-28
Payer: COMMERCIAL

## 2020-01-29 ENCOUNTER — OFFICE VISIT (OUTPATIENT)
Dept: PHYSICAL THERAPY | Age: 50
End: 2020-01-29
Payer: COMMERCIAL

## 2020-01-29 DIAGNOSIS — S43.432A LABRAL TEAR OF SHOULDER, LEFT, INITIAL ENCOUNTER: Primary | ICD-10-CM

## 2020-01-29 PROCEDURE — 97110 THERAPEUTIC EXERCISES: CPT | Performed by: PHYSICAL THERAPIST

## 2020-01-29 NOTE — PROGRESS NOTES
Daily Note     Today's date: 2020  Patient name: Melonie Thompson  : 1970  MRN: [id number]  Referring provider: Jaylin Metzger MD  Dx:   Encounter Diagnosis     ICD-10-CM    1  Labral tear of shoulder, left, initial encounter C38 903H                   Subjective: Pt reports less pain overall, states he can do 10 push ups at this time      Objective: See treatment diary below      Assessment: Tolerated treatment well  Patient demonstrated fatigue post treatment, would benefit from continued PT and pt has increased strength in L UE and has increased activity tolerance since start of PT  Plan: Continue per plan of care  Progress treatment as tolerated         Precautions: N/A      Manual            Inf GH mobs JEONG np np          Pec minor stretch  np np                                                     Exercise Diary            Arm bike 10' 10' 10'          Lat pull down 50# 3x15 70# 4x10 70# 4x10          Tricep ext 35# 3x15 40# 3x15 LUE 20# 3x10          L chest press 8# 4x15 8# 4x15 20# 2x20          IR/ER  3x10 5# np          1 arm push up position BOSU  2x30s pushups x10          Theracane  5' np                                                                                                                                                                                       Modalities

## 2020-01-30 ENCOUNTER — APPOINTMENT (OUTPATIENT)
Dept: PHYSICAL THERAPY | Age: 50
End: 2020-01-30
Payer: COMMERCIAL

## 2020-02-05 ENCOUNTER — APPOINTMENT (OUTPATIENT)
Dept: PHYSICAL THERAPY | Age: 50
End: 2020-02-05
Payer: COMMERCIAL

## 2020-02-12 ENCOUNTER — EVALUATION (OUTPATIENT)
Dept: PHYSICAL THERAPY | Age: 50
End: 2020-02-12
Payer: COMMERCIAL

## 2020-02-12 DIAGNOSIS — S43.432A LABRAL TEAR OF SHOULDER, LEFT, INITIAL ENCOUNTER: Primary | ICD-10-CM

## 2020-02-12 PROCEDURE — 97110 THERAPEUTIC EXERCISES: CPT | Performed by: PHYSICAL THERAPIST

## 2020-02-12 PROCEDURE — 97164 PT RE-EVAL EST PLAN CARE: CPT | Performed by: PHYSICAL THERAPIST

## 2020-02-12 NOTE — PROGRESS NOTES
PT Evaluation     Today's date: 2020  Patient name: Jackie Kaiser  : 1970  MRN: [id number]  Referring provider: Rosa Calderon MD  Dx:   Encounter Diagnosis     ICD-10-CM    1  Labral tear of shoulder, left, initial encounter V20 748X                   Assessment  Assessment details: Since presenting to PT with cervical radiculopathy affecting LUE, pt has had decreased N/T and pain and increased strength  Pt will be discharged with HEP at this time and is agreeable to plan      Goals  In 4 weeks pt will:  -Be independent with phase I of HEP-met  -report pain 5/10 at worst-met    By discharge pt will:  -Be independent with Phase II of HEP-met  -demonstrate full L shoulder ROM-met  -perform full cervical ROM w/o radiculopathy -met        Subjective Evaluation    History of Present Illness  Date of onset: 9/15/2019  Pain  Current pain ratin  At best pain ratin  At worst pain ratin          Objective     Active Range of Motion   Cervical/Thoracic Spine       Cervical    Flexion:  WFL  Extension:  WFL  Left lateral flexion:  WFL  Right lateral flexion:  WFL  Left rotation:  WFL  Right rotation:  [facility]    Strength/Myotome Testing     Left Shoulder     Planes of Motion   External rotation at 0°: 5   Internal rotation at 0°: 5     Left Elbow   Flexion: 5  Extension: 5    Left Wrist/Hand   Wrist flexion: 5             Precautions: N/A      Manual              Inf GH mobs JEONG            Pec minor stretch                                                        Exercise Diary              Arm bike 5'            Lat pull down 80# 3x15            Tricep ext 40# 3x15            L chest press np            Rows 40# 3x15                                                                                                                                                                                                                   Modalities

## 2020-07-10 ENCOUNTER — TELEPHONE (OUTPATIENT)
Dept: INTERNAL MEDICINE CLINIC | Facility: CLINIC | Age: 50
End: 2020-07-10

## 2020-07-10 NOTE — TELEPHONE ENCOUNTER
Left message on machine for patient to call me at [facility] office  Pt last seen by Dr Elena Chris 12/4/19  Pt is due for a follow up appt   Please schedule  Pt is due for CRC screen  See if patient is willing to do colonoscopy or Cologuard  Let me know

## 2020-07-31 NOTE — TELEPHONE ENCOUNTER
3rd attempt to reach patient    Left message on machine for patient to call me at [facility]

## 2020-11-11 NOTE — TELEPHONE ENCOUNTER
Patient's unwillingness to respond and set up appointment demonstrates patient does not wish to continue in our care  PCP removed  PCP can be updated to new PCP during next appointment for patient with new PCP

## 2021-04-02 ENCOUNTER — OFFICE VISIT (OUTPATIENT)
Dept: INTERNAL MEDICINE CLINIC | Facility: CLINIC | Age: 51
End: 2021-04-02
Payer: COMMERCIAL

## 2021-04-02 VITALS
TEMPERATURE: 96.8 F | DIASTOLIC BLOOD PRESSURE: 84 MMHG | HEIGHT: 67 IN | WEIGHT: 182.4 LBS | BODY MASS INDEX: 28.63 KG/M2 | HEART RATE: 81 BPM | SYSTOLIC BLOOD PRESSURE: 136 MMHG | OXYGEN SATURATION: 98 %

## 2021-04-02 DIAGNOSIS — Z00.00 ENCOUNTER FOR WELL ADULT EXAM WITHOUT ABNORMAL FINDINGS: ICD-10-CM

## 2021-04-02 DIAGNOSIS — Z12.11 SCREENING FOR COLON CANCER: Primary | ICD-10-CM

## 2021-04-02 DIAGNOSIS — Z12.5 SCREENING FOR PROSTATE CANCER: ICD-10-CM

## 2021-04-02 DIAGNOSIS — I10 ESSENTIAL HYPERTENSION: ICD-10-CM

## 2021-04-02 PROCEDURE — 3725F SCREEN DEPRESSION PERFORMED: CPT | Performed by: INTERNAL MEDICINE

## 2021-04-02 PROCEDURE — 99396 PREV VISIT EST AGE 40-64: CPT | Performed by: INTERNAL MEDICINE

## 2021-04-02 PROCEDURE — 3008F BODY MASS INDEX DOCD: CPT | Performed by: INTERNAL MEDICINE

## 2021-04-02 RX ORDER — MELATONIN
1000 DAILY
COMMUNITY

## 2021-04-02 RX ORDER — DIPHENOXYLATE HYDROCHLORIDE AND ATROPINE SULFATE 2.5; .025 MG/1; MG/1
1 TABLET ORAL DAILY
COMMUNITY

## 2021-04-02 NOTE — PROGRESS NOTES
Assessment/Plan:    Encounter for well adult exam without abnormal findings  Will obtain some routine 2 year screenings including a baseline PSA    Essential hypertension   Borderline high blood pressures  Discussed some non-medical therapies for blood pressure control  Screening for prostate cancer    Baseline PSA ordered       Diagnoses and all orders for this visit:    Screening for colon cancer  -     Ambulatory referral to Gastroenterology; Future  -     CBC and Platelet; Future    Essential hypertension  -     CBC and Platelet; Future  -     Comprehensive metabolic panel; Future    Encounter for well adult exam without abnormal findings  -     CBC and Platelet; Future  -     Comprehensive metabolic panel; Future  -     Lipid panel; Future  -     PSA, total and free; Future    Screening for prostate cancer  -     PSA, total and free; Future    Other orders  -     multivitamin (THERAGRAN) TABS; Take 1 tablet by mouth daily  -     cholecalciferol (VITAMIN D3) 1,000 units tablet; Take 1,000 Units by mouth daily          Subjective:      Patient ID: Roxy Bentley is a 48 y o  male  Patient presents for an annual well visit  He reports no new issues  He attended physical therapy for multiple sessions last year because of some left shoulder pain  Insurance denied the request for an MRI  He continues to experience some mild shoulder discomfort but for the most part is significantly improved  There does appear to be some mild atrophy of the shoulder muscles but he does not complain of any loss of hand  strength  He has been checking his blood pressure on a regular basis at home  He does find varying blood pressure readings ranging from is a systolic blood pressures in the 140s and 150s down to the 110s and 557S and diastolic blood pressures in the 90s and 100 with lows in the 60s and 70s    He did experience some significant orthostatic changes after being started on amlodipine last year so he tried reducing the dose to a half a tablet and he continued to experience episodes of lightheadedness and dizziness upon changes in position  He was also asked by his wife to discuss the issue of diminished frequency of intimate relations  The patient works nights so this presents a problem with regard to individual biorhythms  There is no question as to function or desire, it seems to be more just timing and opportunity          Family History   Problem Relation Age of Onset    Diabetes Mother      Social History     Socioeconomic History    Marital status: /Civil Union     Spouse name: Not on file    Number of children: Not on file    Years of education: Not on file    Highest education level: Not on file   Occupational History    Not on file   Social Needs    Financial resource strain: Not on file    Food insecurity     Worry: Not on file     Inability: Not on file   [employer] needs     Medical: Not on file     Non-medical: Not on file   Tobacco Use    Smoking status: Former Smoker     Packs/day: 1 00     Years: 5 00     Pack years: 5 00     Quit date: 1991     Years since quittin 1    Smokeless tobacco: Former User   Substance and Sexual Activity    Alcohol use: Yes     Frequency: 4 or more times a week     Comment: 1 bottle whiskey/week    Drug use: Never    Sexual activity: Yes   Lifestyle    Physical activity     Days per week: Not on file     Minutes per session: Not on file    Stress: Not on file   Relationships    Social connections     Talks on phone: Not on file     Gets together: Not on file     Attends [employer] service: Not on file     Active member of club or organization: Not on file     Attends meetings of clubs or organizations: Not on file     Relationship status: Not on file    Intimate partner violence     Fear of current or ex partner: Not on file     Emotionally abused: Not on file     Physically abused: Not on file     Forced sexual activity: Not on file Other Topics Concern    Not on file   Social History Narrative    Not on file     Past Medical History:   Diagnosis Date    Hypertension, essential 12/4/2019    No pertinent past medical history        Current Outpatient Medications:     cholecalciferol (VITAMIN D3) 1,000 units tablet, Take 1,000 Units by mouth daily, Disp: , Rfl:     multivitamin (THERAGRAN) TABS, Take 1 tablet by mouth daily, Disp: , Rfl:   No Known Allergies  Past Surgical History:   Procedure Laterality Date    FRACTURE SURGERY           Review of Systems   Constitutional: Negative  HENT: Negative  Eyes: Negative  Respiratory: Negative  Cardiovascular: Negative  Gastrointestinal: Negative  Endocrine: Negative  Genitourinary: Negative  Musculoskeletal: Positive for arthralgias (intermittent left shoulder issues)  Allergic/Immunologic: Negative  Neurological: Negative  Hematological: Negative  Psychiatric/Behavioral: Negative  Objective:      /84 (BP Location: Right arm, Patient Position: Sitting, Cuff Size: Adult)   Pulse 81   Temp (!) 96 8 °F (36 °C) (Tympanic)   Ht 5' 7" (1 702 m)   Wt 82 7 kg (182 lb 6 4 oz)   SpO2 98%   BMI 28 57 kg/m²  BMI Counseling: Body mass index is 28 57 kg/m²  The BMI is above normal  Nutrition recommendations include reducing portion sizes, 3-5 servings of fruits/vegetables daily, moderation in carbohydrate intake, increasing intake of lean protein, reducing intake of saturated fat and trans fat and reducing intake of cholesterol  Exercise recommendations include exercising 3-5 times per week  Physical Exam  Vitals signs reviewed  Constitutional:       General: He is not in acute distress  Appearance: Normal appearance  He is normal weight  He is not ill-appearing, toxic-appearing or diaphoretic  HENT:      Head: Normocephalic and atraumatic        Right Ear: External ear normal       Left Ear: External ear normal       Nose: Nose normal  Eyes:      General: No scleral icterus  Conjunctiva/sclera: Conjunctivae normal       Pupils: Pupils are equal, round, and reactive to light  Neck:      Musculoskeletal: Neck supple  Vascular: No carotid bruit or JVD  Trachea: No tracheal deviation  Cardiovascular:      Rate and Rhythm: Normal rate and regular rhythm  Pulses: Normal pulses  Heart sounds: Normal heart sounds  No murmur  Pulmonary:      Effort: Pulmonary effort is normal  No respiratory distress  Breath sounds: Normal breath sounds  Abdominal:      General: Abdomen is flat  There is no distension  Tenderness: There is no abdominal tenderness  Musculoskeletal:         General: No swelling, tenderness, deformity or signs of injury  Right lower leg: No edema  Left lower leg: No edema  Lymphadenopathy:      Cervical: No cervical adenopathy  Skin:     General: Skin is warm  Capillary Refill: Capillary refill takes less than 2 seconds  Coloration: Skin is not jaundiced  Findings: No bruising, erythema or rash  Neurological:      General: No focal deficit present  Mental Status: He is alert and oriented to person, place, and time  Mental status is at baseline  Cranial Nerves: No cranial nerve deficit  Sensory: No sensory deficit  Coordination: Coordination normal    Psychiatric:         Mood and Affect: Mood normal          Behavior: Behavior normal          Thought Content:  Thought content normal          Judgment: Judgment normal

## 2021-06-11 LAB
ALBUMIN SERPL-MCNC: 4.3 G/DL (ref 3.6–5.1)
ALBUMIN/GLOB SERPL: 1.7 (CALC) (ref 1–2.5)
ALP SERPL-CCNC: 75 U/L (ref 35–144)
ALT SERPL-CCNC: 42 U/L (ref 9–46)
AST SERPL-CCNC: 23 U/L (ref 10–35)
BILIRUB SERPL-MCNC: 0.6 MG/DL (ref 0.2–1.2)
BUN SERPL-MCNC: 26 MG/DL (ref 7–25)
BUN/CREAT SERPL: 32 (CALC) (ref 6–22)
CALCIUM SERPL-MCNC: 9.5 MG/DL (ref 8.6–10.3)
CHLORIDE SERPL-SCNC: 102 MMOL/L (ref 98–110)
CHOLEST SERPL-MCNC: 180 MG/DL
CHOLEST/HDLC SERPL: 3.4 (CALC)
CO2 SERPL-SCNC: 27 MMOL/L (ref 20–32)
CREAT SERPL-MCNC: 0.82 MG/DL (ref 0.7–1.33)
ERYTHROCYTE [DISTWIDTH] IN BLOOD BY AUTOMATED COUNT: 11.8 % (ref 11–15)
GLOBULIN SER CALC-MCNC: 2.5 G/DL (CALC) (ref 1.9–3.7)
GLUCOSE SERPL-MCNC: 104 MG/DL (ref 65–99)
HCT VFR BLD AUTO: 44 % (ref 38.5–50)
HDLC SERPL-MCNC: 53 MG/DL
HGB BLD-MCNC: 15 G/DL (ref 13.2–17.1)
LDLC SERPL CALC-MCNC: 109 MG/DL (CALC)
MCH RBC QN AUTO: 32.3 PG (ref 27–33)
MCHC RBC AUTO-ENTMCNC: 34.1 G/DL (ref 32–36)
MCV RBC AUTO: 94.6 FL (ref 80–100)
NONHDLC SERPL-MCNC: 127 MG/DL (CALC)
PLATELET # BLD AUTO: 185 THOUSAND/UL (ref 140–400)
PMV BLD REES-ECKER: 12.4 FL (ref 7.5–12.5)
POTASSIUM SERPL-SCNC: 4.5 MMOL/L (ref 3.5–5.3)
PROT SERPL-MCNC: 6.8 G/DL (ref 6.1–8.1)
PSA FREE MFR SERPL: 33 % (CALC)
PSA FREE SERPL-MCNC: 0.2 NG/ML
PSA SERPL-MCNC: 0.6 NG/ML
RBC # BLD AUTO: 4.65 MILLION/UL (ref 4.2–5.8)
SL AMB EGFR AFRICAN AMERICAN: 119 ML/MIN/1.73M2
SL AMB EGFR NON AFRICAN AMERICAN: 102 ML/MIN/1.73M2
SODIUM SERPL-SCNC: 139 MMOL/L (ref 135–146)
TRIGL SERPL-MCNC: 85 MG/DL
WBC # BLD AUTO: 5.8 THOUSAND/UL (ref 3.8–10.8)

## 2021-07-07 ENCOUNTER — VBI (OUTPATIENT)
Dept: ADMINISTRATIVE | Facility: OTHER | Age: 51
End: 2021-07-07

## 2021-10-04 ENCOUNTER — OFFICE VISIT (OUTPATIENT)
Dept: INTERNAL MEDICINE CLINIC | Facility: CLINIC | Age: 51
End: 2021-10-04
Payer: COMMERCIAL

## 2021-10-04 VITALS
OXYGEN SATURATION: 98 % | SYSTOLIC BLOOD PRESSURE: 134 MMHG | TEMPERATURE: 95.5 F | BODY MASS INDEX: 29.15 KG/M2 | HEART RATE: 77 BPM | DIASTOLIC BLOOD PRESSURE: 80 MMHG | HEIGHT: 66 IN | WEIGHT: 181.4 LBS

## 2021-10-04 DIAGNOSIS — M75.42 ROTATOR CUFF IMPINGEMENT SYNDROME OF LEFT SHOULDER: ICD-10-CM

## 2021-10-04 DIAGNOSIS — Z12.5 SCREENING FOR PROSTATE CANCER: ICD-10-CM

## 2021-10-04 DIAGNOSIS — Z00.00 ENCOUNTER FOR WELL ADULT EXAM WITHOUT ABNORMAL FINDINGS: ICD-10-CM

## 2021-10-04 DIAGNOSIS — I10 ESSENTIAL HYPERTENSION: ICD-10-CM

## 2021-10-04 DIAGNOSIS — Z12.11 ENCOUNTER FOR SCREENING FOR MALIGNANT NEOPLASM OF COLON: Primary | ICD-10-CM

## 2021-10-04 DIAGNOSIS — Z11.59 NEED FOR HEPATITIS C SCREENING TEST: ICD-10-CM

## 2021-10-04 PROCEDURE — 99396 PREV VISIT EST AGE 40-64: CPT | Performed by: INTERNAL MEDICINE

## 2021-10-04 PROCEDURE — 3008F BODY MASS INDEX DOCD: CPT | Performed by: INTERNAL MEDICINE

## 2022-04-08 ENCOUNTER — RA CDI HCC (OUTPATIENT)
Dept: OTHER | Facility: HOSPITAL | Age: 52
End: 2022-04-08

## 2022-04-08 NOTE — PROGRESS NOTES
[facility]  coding opportunities       Chart reviewed, no opportunity found: CHART REVIEWED, NO OPPORTUNITY FOUND        Patients Insurance        Commercial Insurance: [employer]

## 2022-04-18 ENCOUNTER — OFFICE VISIT (OUTPATIENT)
Dept: INTERNAL MEDICINE CLINIC | Facility: CLINIC | Age: 52
End: 2022-04-18
Payer: COMMERCIAL

## 2022-04-18 VITALS
HEART RATE: 82 BPM | OXYGEN SATURATION: 98 % | TEMPERATURE: 97 F | SYSTOLIC BLOOD PRESSURE: 144 MMHG | WEIGHT: 181.8 LBS | DIASTOLIC BLOOD PRESSURE: 94 MMHG | BODY MASS INDEX: 29.22 KG/M2 | HEIGHT: 66 IN

## 2022-04-18 DIAGNOSIS — Z12.12 ENCOUNTER FOR COLORECTAL CANCER SCREENING: Primary | ICD-10-CM

## 2022-04-18 DIAGNOSIS — M75.42 ROTATOR CUFF IMPINGEMENT SYNDROME OF LEFT SHOULDER: ICD-10-CM

## 2022-04-18 DIAGNOSIS — I10 ESSENTIAL HYPERTENSION: ICD-10-CM

## 2022-04-18 DIAGNOSIS — Z12.11 ENCOUNTER FOR SCREENING FOR MALIGNANT NEOPLASM OF COLON: ICD-10-CM

## 2022-04-18 DIAGNOSIS — Z12.11 ENCOUNTER FOR COLORECTAL CANCER SCREENING: Primary | ICD-10-CM

## 2022-04-18 PROCEDURE — 3008F BODY MASS INDEX DOCD: CPT | Performed by: INTERNAL MEDICINE

## 2022-04-18 PROCEDURE — 3725F SCREEN DEPRESSION PERFORMED: CPT | Performed by: INTERNAL MEDICINE

## 2022-04-18 PROCEDURE — 99213 OFFICE O/P EST LOW 20 MIN: CPT | Performed by: INTERNAL MEDICINE

## 2022-04-18 RX ORDER — VALSARTAN 40 MG/1
40 TABLET ORAL DAILY
Qty: 30 TABLET | Refills: 5 | Status: SHIPPED | OUTPATIENT
Start: 2022-04-18

## 2022-04-18 NOTE — ASSESSMENT & PLAN NOTE
Patient will attempt to schedule colonoscopy when his work schedule permits which hopefully will be sometime in July

## 2022-04-18 NOTE — PROGRESS NOTES
Assessment/Plan:    Rotator cuff impingement syndrome of left shoulder  Symptoms are now intermittent and for the most part mild except during a flare of shoulder pain  Essential hypertension  Will initiate valsartan 40 mg daily and re-evaluate in 6 months  Encounter for screening for malignant neoplasm of colon  Patient will attempt to schedule colonoscopy when his work schedule permits which hopefully will be sometime in  and all orders for this visit:    Encounter for colorectal cancer screening  -     Ambulatory referral for colonoscopy; Future    Essential hypertension  -     valsartan (DIOVAN) 40 mg tablet; Take 1 tablet (40 mg total) by mouth daily    Rotator cuff impingement syndrome of left shoulder    Encounter for screening for malignant neoplasm of colon          Subjective:      Patient ID: Nikki Huff is a 46 y o  male  Patient presents for 6 month follow-up visit  In general he is doing fairly well  He did not have his blood tests done prior to today's visit  He has been attending physical therapy for his left shoulder  He has experienced some significant improvement but still is subject to flares and episodes of left shoulder pain which can be quite uncomfortable at times  He discontinued amlodipine because lightheadedness and dizziness which was more orthostatic in nature  His blood pressure remains high normal   He is not complaining of any symptoms        Family History   Problem Relation Age of Onset    Diabetes Mother      Social History     Socioeconomic History    Marital status: /Civil Union     Spouse name: Not on file    Number of children: Not on file    Years of education: Not on file    Highest education level: Not on file   Occupational History    Not on file   Tobacco Use    Smoking status: Former Smoker     Packs/day: 1 00     Years: 5 00     Pack years: 5 00     Quit date: 1991     Years since quittin     Smokeless tobacco: Former User   Vaping Use    Vaping Use: Never used   Substance and Sexual Activity    Alcohol use: Yes     Comment: 1 bottle whiskey/week    Drug use: Never    Sexual activity: Yes   Other Topics Concern    Not on file   Social History Narrative    Not on file     Social Determinants of Health     Financial Resource Strain: Not on file   Food Insecurity: Not on file   Transportation Needs: Not on file   Physical Activity: Not on file   Stress: Not on file   Social Connections: Not on file   Intimate Partner Violence: Not on file   Housing Stability: Not on file     Past Medical History:   Diagnosis Date    Hypertension, essential 12/4/2019    No pertinent past medical history        Current Outpatient Medications:     cholecalciferol (VITAMIN D3) 1,000 units tablet, Take 1,000 Units by mouth daily, Disp: , Rfl:     multivitamin (THERAGRAN) TABS, Take 1 tablet by mouth daily, Disp: , Rfl:     valsartan (DIOVAN) 40 mg tablet, Take 1 tablet (40 mg total) by mouth daily, Disp: 30 tablet, Rfl: 5  No Known Allergies  Past Surgical History:   Procedure Laterality Date    FRACTURE SURGERY           Review of Systems   Constitutional: Negative  Negative for activity change, appetite change, chills, diaphoresis, fatigue, fever and unexpected weight change  HENT: Negative  Eyes: Negative  Respiratory: Negative  Cardiovascular: Negative  Gastrointestinal: Negative  Endocrine: Negative  Genitourinary: Negative  Musculoskeletal: Positive for arthralgias (Intermittent left shoulder pain)  Skin: Negative  Neurological: Negative  Hematological: Negative  Psychiatric/Behavioral: The patient is not nervous/anxious            Objective:      /94 (BP Location: Left arm, Patient Position: Sitting, Cuff Size: Standard)   Pulse 82   Temp (!) 97 °F (36 1 °C) (Tympanic)   Ht 5' 6 3" (1 684 m)   Wt 82 5 kg (181 lb 12 8 oz)   SpO2 98%   BMI 29 08 kg/m²          Physical Exam  Vitals reviewed  Constitutional:       General: He is not in acute distress  Appearance: Normal appearance  He is normal weight  He is not ill-appearing, toxic-appearing or diaphoretic  HENT:      Head: Normocephalic and atraumatic  Right Ear: External ear normal       Left Ear: External ear normal       Nose: Nose normal    Eyes:      General: No scleral icterus  Conjunctiva/sclera: Conjunctivae normal       Pupils: Pupils are equal, round, and reactive to light  Neck:      Vascular: No carotid bruit or JVD  Trachea: No tracheal deviation  Cardiovascular:      Rate and Rhythm: Normal rate and regular rhythm  Pulmonary:      Effort: Pulmonary effort is normal  No respiratory distress  Breath sounds: Normal breath sounds  Abdominal:      General: Abdomen is flat  There is no distension  Musculoskeletal:         General: No swelling  Cervical back: Neck supple  Right lower leg: No edema  Left lower leg: No edema  Skin:     Coloration: Skin is not jaundiced  Findings: No bruising, erythema or rash  Neurological:      General: No focal deficit present  Mental Status: He is alert and oriented to person, place, and time  Mental status is at baseline     Psychiatric:         Mood and Affect: Mood normal          Behavior: Behavior normal

## 2022-06-09 ENCOUNTER — VBI (OUTPATIENT)
Dept: ADMINISTRATIVE | Facility: OTHER | Age: 52
End: 2022-06-09

## 2022-06-11 LAB
ALBUMIN SERPL-MCNC: 4.4 G/DL (ref 3.6–5.1)
ALBUMIN/GLOB SERPL: 1.7 (CALC) (ref 1–2.5)
ALP SERPL-CCNC: 74 U/L (ref 35–144)
ALT SERPL-CCNC: 34 U/L (ref 9–46)
AST SERPL-CCNC: 22 U/L (ref 10–35)
BASOPHILS # BLD AUTO: 51 CELLS/UL (ref 0–200)
BASOPHILS NFR BLD AUTO: 1.1 %
BILIRUB SERPL-MCNC: 0.9 MG/DL (ref 0.2–1.2)
BUN SERPL-MCNC: 20 MG/DL (ref 7–25)
BUN/CREAT SERPL: ABNORMAL (CALC) (ref 6–22)
CALCIUM SERPL-MCNC: 9.6 MG/DL (ref 8.6–10.3)
CHLORIDE SERPL-SCNC: 104 MMOL/L (ref 98–110)
CHOLEST SERPL-MCNC: 165 MG/DL
CHOLEST/HDLC SERPL: 3.3 (CALC)
CO2 SERPL-SCNC: 30 MMOL/L (ref 20–32)
CREAT SERPL-MCNC: 0.8 MG/DL (ref 0.7–1.33)
EOSINOPHIL # BLD AUTO: 212 CELLS/UL (ref 15–500)
EOSINOPHIL NFR BLD AUTO: 4.6 %
ERYTHROCYTE [DISTWIDTH] IN BLOOD BY AUTOMATED COUNT: 11.6 % (ref 11–15)
GLOBULIN SER CALC-MCNC: 2.6 G/DL (CALC) (ref 1.9–3.7)
GLUCOSE SERPL-MCNC: 104 MG/DL (ref 65–99)
HCT VFR BLD AUTO: 45.8 % (ref 38.5–50)
HCV AB S/CO SERPL IA: 0.13
HCV AB SERPL QL IA: NORMAL
HDLC SERPL-MCNC: 50 MG/DL
HGB BLD-MCNC: 15.8 G/DL (ref 13.2–17.1)
LDLC SERPL CALC-MCNC: 100 MG/DL (CALC)
LYMPHOCYTES # BLD AUTO: 1642 CELLS/UL (ref 850–3900)
LYMPHOCYTES NFR BLD AUTO: 35.7 %
MCH RBC QN AUTO: 31.9 PG (ref 27–33)
MCHC RBC AUTO-ENTMCNC: 34.5 G/DL (ref 32–36)
MCV RBC AUTO: 92.3 FL (ref 80–100)
MONOCYTES # BLD AUTO: 465 CELLS/UL (ref 200–950)
MONOCYTES NFR BLD AUTO: 10.1 %
NEUTROPHILS # BLD AUTO: 2231 CELLS/UL (ref 1500–7800)
NEUTROPHILS NFR BLD AUTO: 48.5 %
NONHDLC SERPL-MCNC: 115 MG/DL (CALC)
PLATELET # BLD AUTO: 185 THOUSAND/UL (ref 140–400)
PMV BLD REES-ECKER: 12.9 FL (ref 7.5–12.5)
POTASSIUM SERPL-SCNC: 4.3 MMOL/L (ref 3.5–5.3)
PROT SERPL-MCNC: 7 G/DL (ref 6.1–8.1)
PSA FREE MFR SERPL: 17 % (CALC)
PSA FREE SERPL-MCNC: 0.1 NG/ML
PSA SERPL-MCNC: 0.6 NG/ML
RBC # BLD AUTO: 4.96 MILLION/UL (ref 4.2–5.8)
SL AMB EGFR AFRICAN AMERICAN: 119 ML/MIN/1.73M2
SL AMB EGFR NON AFRICAN AMERICAN: 103 ML/MIN/1.73M2
SODIUM SERPL-SCNC: 142 MMOL/L (ref 135–146)
TRIGL SERPL-MCNC: 61 MG/DL
WBC # BLD AUTO: 4.6 THOUSAND/UL (ref 3.8–10.8)

## 2022-09-07 ENCOUNTER — VBI (OUTPATIENT)
Dept: ADMINISTRATIVE | Facility: OTHER | Age: 52
End: 2022-09-07

## 2022-10-12 PROBLEM — Z12.5 SCREENING FOR PROSTATE CANCER: Status: RESOLVED | Noted: 2021-04-02 | Resolved: 2022-10-12

## 2022-10-12 PROBLEM — Z12.11 ENCOUNTER FOR SCREENING FOR MALIGNANT NEOPLASM OF COLON: Status: RESOLVED | Noted: 2021-10-04 | Resolved: 2022-10-12

## 2022-10-20 ENCOUNTER — VBI (OUTPATIENT)
Dept: ADMINISTRATIVE | Facility: OTHER | Age: 52
End: 2022-10-20

## 2022-11-07 ENCOUNTER — OFFICE VISIT (OUTPATIENT)
Dept: INTERNAL MEDICINE CLINIC | Facility: CLINIC | Age: 52
End: 2022-11-07

## 2022-11-07 VITALS
OXYGEN SATURATION: 98 % | BODY MASS INDEX: 29.35 KG/M2 | TEMPERATURE: 98.3 F | HEART RATE: 74 BPM | SYSTOLIC BLOOD PRESSURE: 112 MMHG | WEIGHT: 182.6 LBS | HEIGHT: 66 IN | DIASTOLIC BLOOD PRESSURE: 82 MMHG

## 2022-11-07 DIAGNOSIS — Z12.5 SCREENING FOR PROSTATE CANCER: Primary | ICD-10-CM

## 2022-11-07 DIAGNOSIS — I10 ESSENTIAL HYPERTENSION: ICD-10-CM

## 2022-11-07 RX ORDER — VALSARTAN 40 MG/1
40 TABLET ORAL DAILY
Qty: 90 TABLET | Refills: 1 | Status: SHIPPED | OUTPATIENT
Start: 2022-11-07

## 2022-11-07 NOTE — ASSESSMENT & PLAN NOTE
Blood pressure is nicely controlled on valsartan 40 mg  Lipids are at goal level  CBC and CMP are within normal limits  PSA is unremarkable

## 2022-11-07 NOTE — PROGRESS NOTES
Name: Maty Hanley      : 1970      MRN: [id number]  Encounter Provider: Live Malcolm MD  Encounter Date: 2022   Encounter department: [address] [place] [address]  Essential hypertension           Subjective      Patient presents to the office for follow-up for blood pressure check  He was started on valsartan 6 months ago and has tolerated the medication without difficulty  He had some blood work done previously which was reviewed  All labs are essentially within normal limits  Lipid profile is at goal   Metabolic panel and CBC are all within normal limits  Glucose was 104 but it was not fasting  PSA was normal   Patient reports no issues other than some left shoulder issues which have improved with therapeutic exercises  Review of Systems   Constitutional: Negative  Negative for activity change, appetite change, chills, diaphoresis, fatigue, fever and unexpected weight change  HENT: Negative  Eyes: Negative  Respiratory: Negative  Cardiovascular: Negative  Gastrointestinal: Negative  Endocrine: Negative  Genitourinary: Negative  Musculoskeletal: Negative  Skin: Negative  Neurological: Negative  Hematological: Negative  Psychiatric/Behavioral: The patient is not nervous/anxious  Current Outpatient Medications on File Prior to Visit   Medication Sig   • cholecalciferol (VITAMIN D3) 1,000 units tablet Take 1,000 Units by mouth daily   • multivitamin (THERAGRAN) TABS Take 1 tablet by mouth daily   • valsartan (DIOVAN) 40 mg tablet Take 1 tablet (40 mg total) by mouth daily       Objective     /82 (BP Location: Left arm, Patient Position: Sitting, Cuff Size: Standard)   Pulse 74   Temp 98 3 °F (36 8 °C) (Temporal)   Ht 5' 6 34" (1 685 m)   Wt 82 8 kg (182 lb 9 6 oz)   SpO2 98%   BMI 29 17 kg/m²     Physical Exam  Constitutional:       General: He is not in acute distress  Appearance: Normal appearance  He is normal weight  He is not ill-appearing, toxic-appearing or diaphoretic  HENT:      Head: Normocephalic and atraumatic  Right Ear: External ear normal       Left Ear: External ear normal    Eyes:      General: No scleral icterus  Conjunctiva/sclera: Conjunctivae normal       Pupils: Pupils are equal, round, and reactive to light  Cardiovascular:      Rate and Rhythm: Normal rate and regular rhythm  Pulmonary:      Effort: Pulmonary effort is normal  No respiratory distress  Abdominal:      General: Abdomen is flat  There is no distension  Musculoskeletal:         General: No swelling  Cervical back: Normal range of motion and neck supple  Right lower leg: No edema  Left lower leg: No edema  Skin:     Coloration: Skin is not jaundiced  Findings: No bruising, erythema or rash  Neurological:      General: No focal deficit present  Mental Status: He is alert and oriented to person, place, and time  Mental status is at baseline  Psychiatric:         Mood and Affect: Mood normal          Behavior: Behavior normal          Thought Content:  Thought content normal          Judgment: Judgment normal        Karon Carr MD

## 2022-12-28 ENCOUNTER — VBI (OUTPATIENT)
Dept: ADMINISTRATIVE | Facility: OTHER | Age: 52
End: 2022-12-28

## 2023-03-28 ENCOUNTER — TELEPHONE (OUTPATIENT)
Dept: GASTROENTEROLOGY | Facility: CLINIC | Age: 53
End: 2023-03-28

## 2023-07-13 ENCOUNTER — TELEPHONE (OUTPATIENT)
Dept: INTERNAL MEDICINE CLINIC | Facility: CLINIC | Age: 53
End: 2023-07-13

## 2023-07-13 DIAGNOSIS — I10 ESSENTIAL HYPERTENSION: ICD-10-CM

## 2023-07-13 RX ORDER — VALSARTAN 40 MG/1
40 TABLET ORAL DAILY
Qty: 90 TABLET | Refills: 1 | Status: SHIPPED | OUTPATIENT
Start: 2023-07-13

## 2023-07-20 DIAGNOSIS — I10 ESSENTIAL HYPERTENSION: Primary | ICD-10-CM

## 2023-07-20 DIAGNOSIS — Z12.5 SCREENING FOR PROSTATE CANCER: ICD-10-CM

## 2023-08-10 LAB
ALBUMIN SERPL-MCNC: 4.4 G/DL (ref 3.6–5.1)
ALBUMIN/GLOB SERPL: 1.6 (CALC) (ref 1–2.5)
ALP SERPL-CCNC: 70 U/L (ref 35–144)
ALT SERPL-CCNC: 30 U/L (ref 9–46)
AST SERPL-CCNC: 21 U/L (ref 10–35)
BASOPHILS # BLD AUTO: 41 CELLS/UL (ref 0–200)
BASOPHILS NFR BLD AUTO: 0.9 %
BILIRUB SERPL-MCNC: 0.8 MG/DL (ref 0.2–1.2)
BUN SERPL-MCNC: 25 MG/DL (ref 7–25)
BUN/CREAT SERPL: ABNORMAL (CALC) (ref 6–22)
CALCIUM SERPL-MCNC: 9.3 MG/DL (ref 8.6–10.3)
CHLORIDE SERPL-SCNC: 103 MMOL/L (ref 98–110)
CHOLEST SERPL-MCNC: 171 MG/DL
CHOLEST/HDLC SERPL: 3.1 (CALC)
CO2 SERPL-SCNC: 30 MMOL/L (ref 20–32)
CREAT SERPL-MCNC: 0.89 MG/DL (ref 0.7–1.3)
EOSINOPHIL # BLD AUTO: 294 CELLS/UL (ref 15–500)
EOSINOPHIL NFR BLD AUTO: 6.4 %
ERYTHROCYTE [DISTWIDTH] IN BLOOD BY AUTOMATED COUNT: 11.6 % (ref 11–15)
GFR/BSA.PRED SERPLBLD CYS-BASED-ARV: 102 ML/MIN/1.73M2
GLOBULIN SER CALC-MCNC: 2.7 G/DL (CALC) (ref 1.9–3.7)
GLUCOSE SERPL-MCNC: 101 MG/DL (ref 65–99)
HCT VFR BLD AUTO: 42 % (ref 38.5–50)
HDLC SERPL-MCNC: 56 MG/DL
HGB BLD-MCNC: 14.6 G/DL (ref 13.2–17.1)
LDLC SERPL CALC-MCNC: 96 MG/DL (CALC)
LYMPHOCYTES # BLD AUTO: 1306 CELLS/UL (ref 850–3900)
LYMPHOCYTES NFR BLD AUTO: 28.4 %
MCH RBC QN AUTO: 32.9 PG (ref 27–33)
MCHC RBC AUTO-ENTMCNC: 34.8 G/DL (ref 32–36)
MCV RBC AUTO: 94.6 FL (ref 80–100)
MONOCYTES # BLD AUTO: 506 CELLS/UL (ref 200–950)
MONOCYTES NFR BLD AUTO: 11 %
NEUTROPHILS # BLD AUTO: 2452 CELLS/UL (ref 1500–7800)
NEUTROPHILS NFR BLD AUTO: 53.3 %
NONHDLC SERPL-MCNC: 115 MG/DL (CALC)
PLATELET # BLD AUTO: 184 THOUSAND/UL (ref 140–400)
PMV BLD REES-ECKER: 13.2 FL (ref 7.5–12.5)
POTASSIUM SERPL-SCNC: 4.5 MMOL/L (ref 3.5–5.3)
PROT SERPL-MCNC: 7.1 G/DL (ref 6.1–8.1)
PSA SERPL-MCNC: 1.02 NG/ML
RBC # BLD AUTO: 4.44 MILLION/UL (ref 4.2–5.8)
SODIUM SERPL-SCNC: 140 MMOL/L (ref 135–146)
TRIGL SERPL-MCNC: 91 MG/DL
WBC # BLD AUTO: 4.6 THOUSAND/UL (ref 3.8–10.8)

## 2023-09-01 ENCOUNTER — RA CDI HCC (OUTPATIENT)
Dept: OTHER | Facility: HOSPITAL | Age: 53
End: 2023-09-01

## 2023-09-01 NOTE — PROGRESS NOTES
[address] [facility] coding opportunities       Chart reviewed, no opportunity found: CHART REVIEWED, NO OPPORTUNITY FOUND        Patients Insurance        Commercial Insurance: Edgard Aquino

## 2023-09-08 ENCOUNTER — OFFICE VISIT (OUTPATIENT)
Dept: INTERNAL MEDICINE CLINIC | Age: 53
End: 2023-09-08
Payer: COMMERCIAL

## 2023-09-08 VITALS
HEIGHT: 67 IN | SYSTOLIC BLOOD PRESSURE: 130 MMHG | BODY MASS INDEX: 28.77 KG/M2 | HEART RATE: 74 BPM | OXYGEN SATURATION: 98 % | TEMPERATURE: 97.5 F | DIASTOLIC BLOOD PRESSURE: 90 MMHG | WEIGHT: 183.3 LBS

## 2023-09-08 DIAGNOSIS — Z12.11 ENCOUNTER FOR SCREENING FOR MALIGNANT NEOPLASM OF COLON: Primary | ICD-10-CM

## 2023-09-08 DIAGNOSIS — Z12.5 SCREENING FOR PROSTATE CANCER: ICD-10-CM

## 2023-09-08 DIAGNOSIS — I10 ESSENTIAL HYPERTENSION: ICD-10-CM

## 2023-09-08 PROBLEM — M75.42 ROTATOR CUFF IMPINGEMENT SYNDROME OF LEFT SHOULDER: Status: RESOLVED | Noted: 2019-12-04 | Resolved: 2023-09-08

## 2023-09-08 PROBLEM — S43.432A LABRAL TEAR OF SHOULDER, LEFT, INITIAL ENCOUNTER: Status: RESOLVED | Noted: 2019-12-04 | Resolved: 2023-09-08

## 2023-09-08 PROBLEM — Z00.00 ENCOUNTER FOR WELL ADULT EXAM WITHOUT ABNORMAL FINDINGS: Status: RESOLVED | Noted: 2019-10-11 | Resolved: 2023-09-08

## 2023-09-08 PROCEDURE — 99213 OFFICE O/P EST LOW 20 MIN: CPT | Performed by: INTERNAL MEDICINE

## 2023-09-08 RX ORDER — VALSARTAN 40 MG/1
40 TABLET ORAL DAILY
Qty: 90 TABLET | Refills: 1 | Status: SHIPPED | OUTPATIENT
Start: 2023-09-08

## 2023-09-08 NOTE — ASSESSMENT & PLAN NOTE
Blood pressure is high normal but controlled. Patient is coming to the office after working all night. Continue current medication. Patient was given prescriptions for labs and referral for screening colonoscopy.

## 2023-09-08 NOTE — PROGRESS NOTES
Name: Nela Bowman      : 1970      MRN: [id number]  Encounter Provider: Kristan Salcedo MD  Encounter Date: 2023   Encounter department: [address] [facility]     1. Encounter for screening for malignant neoplasm of colon  -     Ambulatory Referral to Gastroenterology; Future    2. Essential hypertension  Assessment & Plan:  Blood pressure is high normal but controlled. Patient is coming to the office after working all night. Continue current medication. Patient was given prescriptions for labs and referral for screening colonoscopy. Orders:  -     valsartan (DIOVAN) 40 mg tablet; Take 1 tablet (40 mg total) by mouth daily  -     CBC and differential; Future; Expected date: 2024  -     Comprehensive metabolic panel; Future; Expected date: 2024  -     Lipid panel; Future; Expected date: 2024    3. Screening for prostate cancer  -     PSA, Total Screen; Future; Expected date: 2024           Subjective      Presents to the office for follow-up visit. He has been utilizing a series of online videos and stretches for his left shoulder issues and came up on a particular stretch that seems to have relieved his left shoulder problems significantly. He does need to repeat the procedure periodically. He reports no issues with medications. Labs were reviewed. No major abnormalities were encountered on review of labs. CBC was essentially normal.  Metabolic panel was likewise normal.  Fasting glucose was minimally elevated. His PSA was slightly higher than previous but not concerning. We will continue to monitor the trend. Lipids are at goal levels. Review of Systems   Constitutional: Negative. Negative for activity change, appetite change, chills, diaphoresis, fatigue, fever and unexpected weight change. HENT: Negative. Eyes: Negative. Respiratory: Negative. Cardiovascular: Negative. Gastrointestinal: Negative. Endocrine: Negative. Genitourinary: Negative. Musculoskeletal: Negative. Skin: Negative. Neurological: Negative. Hematological: Negative. Psychiatric/Behavioral: The patient is not nervous/anxious. Current Outpatient Medications on File Prior to Visit   Medication Sig   • cholecalciferol (VITAMIN D3) 1,000 units tablet Take 1,000 Units by mouth daily   • multivitamin (THERAGRAN) TABS Take 1 tablet by mouth daily   • [DISCONTINUED] valsartan (DIOVAN) 40 mg tablet Take 1 tablet (40 mg total) by mouth daily       Objective     /90 (BP Location: Left arm, Patient Position: Sitting, Cuff Size: Standard)   Pulse 74   Temp 97.5 °F (36.4 °C) (Temporal)   Ht 5' 6.97" (1.701 m)   Wt 83.1 kg (183 lb 4.8 oz)   SpO2 98%   BMI 28.74 kg/m²     Physical Exam  Vitals reviewed. Constitutional:       General: He is not in acute distress. Appearance: Normal appearance. He is normal weight. He is not ill-appearing, toxic-appearing or diaphoretic. HENT:      Head: Normocephalic and atraumatic. Right Ear: External ear normal.      Left Ear: External ear normal.      Nose: Nose normal.   Eyes:      General: No scleral icterus. Conjunctiva/sclera: Conjunctivae normal.      Pupils: Pupils are equal, round, and reactive to light. Neck:      Vascular: No carotid bruit or JVD. Trachea: No tracheal deviation. Cardiovascular:      Rate and Rhythm: Normal rate and regular rhythm. Pulses: Normal pulses. Heart sounds: Normal heart sounds. No murmur heard. Pulmonary:      Effort: Pulmonary effort is normal. No respiratory distress. Breath sounds: Normal breath sounds. No rales. Abdominal:      General: Abdomen is flat. There is no distension. Musculoskeletal:         General: No swelling. Cervical back: Neck supple. Right lower leg: No edema. Left lower leg: No edema. Skin:     General: Skin is warm. Coloration: Skin is not jaundiced. Findings: No bruising, erythema or rash. Neurological:      General: No focal deficit present. Mental Status: He is alert and oriented to person, place, and time. Mental status is at baseline.    Psychiatric:         Mood and Affect: Mood normal.         Behavior: Behavior normal.       Scot Mohamud MD

## 2023-11-15 ENCOUNTER — VBI (OUTPATIENT)
Dept: ADMINISTRATIVE | Facility: OTHER | Age: 53
End: 2023-11-15

## 2024-01-04 ENCOUNTER — VBI (OUTPATIENT)
Dept: ADMINISTRATIVE | Facility: OTHER | Age: 54
End: 2024-01-04

## 2024-08-02 ENCOUNTER — OFFICE VISIT (OUTPATIENT)
Age: 54
End: 2024-08-02
Payer: COMMERCIAL

## 2024-08-02 VITALS
WEIGHT: 180 LBS | HEIGHT: 66 IN | OXYGEN SATURATION: 98 % | BODY MASS INDEX: 28.93 KG/M2 | SYSTOLIC BLOOD PRESSURE: 118 MMHG | TEMPERATURE: 98.1 F | HEART RATE: 80 BPM | DIASTOLIC BLOOD PRESSURE: 76 MMHG

## 2024-08-02 DIAGNOSIS — Z12.5 SCREENING FOR PROSTATE CANCER: ICD-10-CM

## 2024-08-02 DIAGNOSIS — Z12.11 ENCOUNTER FOR SCREENING FOR MALIGNANT NEOPLASM OF COLON: Primary | ICD-10-CM

## 2024-08-02 DIAGNOSIS — Z00.00 ANNUAL PHYSICAL EXAM: ICD-10-CM

## 2024-08-02 PROCEDURE — 99396 PREV VISIT EST AGE 40-64: CPT | Performed by: INTERNAL MEDICINE

## 2024-08-02 NOTE — ASSESSMENT & PLAN NOTE
Good general state of health. No major issues. Patient to schedule his colonoscopy. To have labs done following visit.

## 2024-08-02 NOTE — PATIENT INSTRUCTIONS
"Patient Education     Routine physical for adults   The Basics   Written by the doctors and editors at [facility]   What is a physical? -- A physical is a routine visit, or \"check-up,\" with your doctor. You might also hear it called a \"wellness visit\" or \"preventive visit.\"  During each visit, the doctor will:   Ask about your physical and mental health   Ask about your habits, behaviors, and lifestyle   Do an exam   Give you vaccines if needed   Talk to you about any medicines you take   Give advice about your health   Answer your questions  Getting regular check-ups is an important part of taking care of your health. It can help your doctor find and treat any problems you have. But it's also important for preventing health problems.  A routine physical is different from a \"sick visit.\" A sick visit is when you see a doctor because of a health concern or problem. Since physicals are scheduled ahead of time, you can think about what you want to ask the doctor.  How often should I get a physical? -- It depends on your age and health. In general, for people age 21 years and older:   If you are younger than 50 years, you might be able to get a physical every 3 years.   If you are 50 years or older, your doctor might recommend a physical every year.  If you have an ongoing health condition, like diabetes or high blood pressure, your doctor will probably want to see you more often.  What happens during a physical? -- In general, each visit will include:   Physical exam - The doctor or nurse will check your height, weight, heart rate, and blood pressure. They will also look at your eyes and ears. They will ask about how you are feeling and whether you have any symptoms that bother you.   Medicines - It's a good idea to bring a list of all the medicines you take to each doctor visit. Your doctor will talk to you about your medicines and answer any questions. Tell them if you are having any side effects that bother you. You " "should also tell them if you are having trouble paying for any of your medicines.   Habits and behaviors - This includes:   Your diet   Your exercise habits   Whether you smoke, drink alcohol, or use drugs   Whether you are sexually active   Whether you feel safe at home  Your doctor will talk to you about things you can do to improve your health and lower your risk of health problems. They will also offer help and support. For example, if you want to quit smoking, they can give you advice and might prescribe medicines. If you want to improve your diet or get more physical activity, they can help you with this, too.   Lab tests, if needed - The tests you get will depend on your age and situation. For example, your doctor might want to check your:   Cholesterol   Blood sugar   Iron level   Vaccines - The recommended vaccines will depend on your age, health, and what vaccines you already had. Vaccines are very important because they can prevent certain serious or deadly infections.   Discussion of screening - \"Screening\" means checking for diseases or other health problems before they cause symptoms. Your doctor can recommend screening based on your age, risk, and preferences. This might include tests to check for:   Cancer, such as breast, prostate, cervical, ovarian, colorectal, prostate, lung, or skin cancer   Sexually transmitted infections, such as chlamydia and gonorrhea   Mental health conditions like depression and anxiety  Your doctor will talk to you about the different types of screening tests. They can help you decide which screenings to have. They can also explain what the results might mean.   Answering questions - The physical is a good time to ask the doctor or nurse questions about your health. If needed, they can refer you to other doctors or specialists, too.  Adults older than 65 years often need other care, too. As you get older, your doctor will talk to you about:   How to prevent falling at " home   Hearing or vision tests   Memory testing   How to take your medicines safely   Making sure that you have the help and support you need at home  All topics are updated as new evidence becomes available and our peer review process is complete.  This topic retrieved from [employer] on: May 02, 2024.  Topic 006925 Version 1.0  Release: 32.4.3 - C32.122  © 2024 UpToDate, Inc. and/or its affiliates. All rights reserved.  Consumer Information Use and Disclaimer   Disclaimer: This generalized information is a limited summary of diagnosis, treatment, and/or medication information. It is not meant to be comprehensive and should be used as a tool to help the user understand and/or assess potential diagnostic and treatment options. It does NOT include all information about conditions, treatments, medications, side effects, or risks that may apply to a specific patient. It is not intended to be medical advice or a substitute for the medical advice, diagnosis, or treatment of a health care provider based on the health care provider's examination and assessment of a patient's specific and unique circumstances. Patients must speak with a health care provider for complete information about their health, medical questions, and treatment options, including any risks or benefits regarding use of medications. This information does not endorse any treatments or medications as safe, effective, or approved for treating a specific patient. UpToDate, Inc. and its affiliates disclaim any warranty or liability relating to this information or the use thereof.The use of this information is governed by the Terms of Use, available at https://www.woltersadFreequwer.com/en/know/clinical-effectiveness-terms. 2024© UpToDate, Inc. and its affiliates and/or licensors. All rights reserved.  Copyright   © 2024 UpToDate, Inc. and/or its affiliates. All rights reserved.

## 2024-08-02 NOTE — PROGRESS NOTES
Adult Annual Physical  Name: Regino Love      : 1970      MRN: [id number]  Encounter Provider: Wilfred Pope MD  Encounter Date: 2024   Encounter department: [facility]    Assessment & Plan   1. Encounter for screening for malignant neoplasm of colon  -     Ambulatory Referral to Gastroenterology; Future  2. Annual physical exam  Assessment & Plan:  Good general state of health. No major issues. Patient to schedule his colonoscopy. To have labs done following visit.    Immunizations and preventive care screenings were discussed with patient today. Appropriate education was printed on patient's after visit summary.    Discussed risks and benefits of prostate cancer screening. We discussed the controversial history of PSA screening for prostate cancer in the [place] as well as the risk of over detection and over treatment of prostate cancer by way of PSA screening.  The patient understands that PSA blood testing is an imperfect way to screen for prostate cancer and that elevated PSA levels in the blood may also be caused by infection, inflammation, prostatic trauma or manipulation, urological procedures, or by benign prostatic enlargement.    The role of the digital rectal examination in prostate cancer screening was also discussed and I discussed with him that there is large interobserver variability in the findings of digital rectal examination.    Counseling:  Dental Health: discussed importance of regular tooth brushing, flossing, and dental visits.  Exercise: the importance of regular exercise/physical activity was discussed. Recommend exercise 3-5 times per week for at least 30 minutes.          History of Present Illness     Adult Annual Physical:  Patient presents for annual physical.     Diet and Physical Activity:  - Diet/Nutrition: well balanced diet, consuming 3-5 servings of fruits/vegetables daily, adequate whole grain intake, adequate  "fiber intake and low fat diet.  - Exercise: walking, 3-4 times a week on average and less than 30 minutes on average.    Depression Screening:  - PHQ-2 Score: 0    General Health:  - Sleep: 4-6 hours of sleep on average.  - Hearing: normal hearing right ear.  - Vision: wears glasses.  - Dental: regular dental visits and brushes teeth twice daily.     Health:  - History of STDs: no.   - Urinary symptoms: none.     Advanced Care Planning:  - Has an advanced directive?: no    - Has a durable medical POA?: no    - ACP document given to patient?: no      Review of Systems   Constitutional: Negative.    HENT: Negative.     Eyes: Negative.    Respiratory: Negative.     Cardiovascular: Negative.    Gastrointestinal: Negative.    Endocrine: Negative.    Genitourinary: Negative.    Musculoskeletal: Negative.         Minor   Skin: Negative.    Allergic/Immunologic: Negative.    Neurological: Negative.    Hematological: Negative.    Psychiatric/Behavioral: Negative.           Objective     /76 (BP Location: Left arm, Patient Position: Sitting, Cuff Size: Standard)   Pulse 80   Temp 98.1 °F (36.7 °C) (Temporal)   Ht 5' 6.02\" (1.677 m)   Wt 81.6 kg (180 lb)   SpO2 98%   BMI 29.03 kg/m²     Physical Exam  Vitals reviewed.   Constitutional:       General: He is not in acute distress.     Appearance: Normal appearance. He is normal weight. He is not ill-appearing, toxic-appearing or diaphoretic.   HENT:      Head: Normocephalic and atraumatic.      Right Ear: Tympanic membrane, ear canal and external ear normal. There is no impacted cerumen.      Left Ear: Tympanic membrane, ear canal and external ear normal. There is no impacted cerumen.      Nose: No congestion or rhinorrhea.      Mouth/Throat:      Mouth: Mucous membranes are moist.      Pharynx: Oropharynx is clear. No oropharyngeal exudate or posterior oropharyngeal erythema.   Eyes:      General: No scleral icterus.     Conjunctiva/sclera: Conjunctivae normal.    "   Pupils: Pupils are equal, round, and reactive to light.   Neck:      Vascular: No carotid bruit.   Cardiovascular:      Rate and Rhythm: Normal rate and regular rhythm.      Heart sounds: Normal heart sounds. No murmur heard.  Pulmonary:      Effort: Pulmonary effort is normal. No respiratory distress.      Breath sounds: Normal breath sounds.   Abdominal:      General: Abdomen is flat. There is no distension.      Tenderness: There is no abdominal tenderness.   Musculoskeletal:         General: No swelling.      Cervical back: Normal range of motion. No rigidity or tenderness.      Right lower leg: No edema.      Left lower leg: No edema.   Skin:     General: Skin is warm.      Capillary Refill: Capillary refill takes less than 2 seconds.      Coloration: Skin is not jaundiced.      Findings: No bruising, erythema or rash.   Neurological:      General: No focal deficit present.      Mental Status: He is alert and oriented to person, place, and time. Mental status is at baseline.   Psychiatric:         Mood and Affect: Mood normal.         Behavior: Behavior normal.         Thought Content: Thought content normal.         Judgment: Judgment normal.

## 2024-08-15 DIAGNOSIS — I10 ESSENTIAL HYPERTENSION: ICD-10-CM

## 2024-08-15 RX ORDER — VALSARTAN 40 MG/1
20 TABLET ORAL DAILY
Qty: 45 TABLET | Refills: 1 | Status: SHIPPED | OUTPATIENT
Start: 2024-08-15